# Patient Record
Sex: FEMALE | Race: WHITE | NOT HISPANIC OR LATINO | Employment: UNEMPLOYED | URBAN - NONMETROPOLITAN AREA
[De-identification: names, ages, dates, MRNs, and addresses within clinical notes are randomized per-mention and may not be internally consistent; named-entity substitution may affect disease eponyms.]

---

## 2018-10-04 ENCOUNTER — HOSPITAL ENCOUNTER (EMERGENCY)
Facility: HOSPITAL | Age: 23
Discharge: HOME | End: 2018-10-04
Attending: FAMILY MEDICINE | Admitting: FAMILY MEDICINE

## 2018-10-04 VITALS
OXYGEN SATURATION: 99 % | BODY MASS INDEX: 18.66 KG/M2 | WEIGHT: 126 LBS | DIASTOLIC BLOOD PRESSURE: 60 MMHG | RESPIRATION RATE: 16 BRPM | SYSTOLIC BLOOD PRESSURE: 129 MMHG | TEMPERATURE: 98.9 F | HEIGHT: 69 IN | HEART RATE: 104 BPM
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
## 2018-10-04 DIAGNOSIS — S01.85XA DOG BITE OF FACE, INITIAL ENCOUNTER: Primary | ICD-10-CM

## 2018-10-04 PROCEDURE — 99283 EMERGENCY DEPT VISIT LOW MDM: CPT

## 2018-10-04 RX ORDER — IBUPROFEN 600 MG/1
600 TABLET ORAL EVERY 8 HOURS PRN
Qty: 30 TABLET | Refills: 0 | Status: ON HOLD | OUTPATIENT
Start: 2018-10-04 | End: 2019-12-01

## 2018-10-04 RX ORDER — HYDROCODONE BITARTRATE AND ACETAMINOPHEN 5; 325 MG/1; MG/1
1 TABLET ORAL ONCE
Status: COMPLETED | OUTPATIENT
Start: 2018-10-04 | End: 2018-10-04

## 2018-10-04 RX ORDER — ONDANSETRON 4 MG/1
4 TABLET, ORALLY DISINTEGRATING ORAL ONCE
Status: COMPLETED | OUTPATIENT
Start: 2018-10-04 | End: 2018-10-04

## 2018-10-04 RX ORDER — LIDOCAINE HYDROCHLORIDE 10 MG/ML
5 INJECTION, SOLUTION EPIDURAL; INFILTRATION; INTRACAUDAL; PERINEURAL ONCE
Status: COMPLETED | OUTPATIENT
Start: 2018-10-04 | End: 2018-10-04

## 2018-10-04 RX ORDER — AMOXICILLIN AND CLAVULANATE POTASSIUM 875; 125 MG/1; MG/1
1 TABLET, FILM COATED ORAL ONCE
Status: COMPLETED | OUTPATIENT
Start: 2018-10-04 | End: 2018-10-04

## 2018-10-04 RX ORDER — AMOXICILLIN AND CLAVULANATE POTASSIUM 875; 125 MG/1; MG/1
1 TABLET, FILM COATED ORAL 2 TIMES DAILY
Qty: 20 TABLET | Refills: 0 | Status: ON HOLD | OUTPATIENT
Start: 2018-10-04 | End: 2019-12-01

## 2018-10-04 RX ADMIN — AMOXICILLIN AND CLAVULANATE POTASSIUM 1 TABLET: 875; 125 TABLET, FILM COATED ORAL at 12:00

## 2018-10-04 RX ADMIN — ONDANSETRON 4 MG: 4 TABLET, ORALLY DISINTEGRATING ORAL at 11:59

## 2018-10-04 RX ADMIN — LIDOCAINE HYDROCHLORIDE 5 ML: 10 INJECTION, SOLUTION EPIDURAL; INFILTRATION; INTRACAUDAL at 13:09

## 2018-10-04 RX ADMIN — HYDROCODONE BITARTRATE AND ACETAMINOPHEN 1 TABLET: 5; 325 TABLET ORAL at 12:00

## 2018-10-04 NOTE — ED PROVIDER NOTES
Subjective     History provided by:  Patient  Animal Bite   Contact animal:  Dog  Location:  Face  Facial injury location:  L cheek and R cheek  Time since incident:  4 hours  Pain details:     Quality:  Aching    Severity:  Moderate    Timing:  Constant    Progression:  Unchanged  Incident location:  Another residence  Provoked: provoked (Pt tried to pet a pit bull that was not hers. )    Animal's rabies vaccination status:  Unknown  Animal in possession: no    Relieved by:  Nothing  Ineffective treatments:  None tried  Associated symptoms: no fever        Review of Systems   Constitutional: Negative.  Negative for fever.   HENT: Negative.    Respiratory: Negative.    Cardiovascular: Negative.  Negative for chest pain.   Gastrointestinal: Negative.  Negative for abdominal pain.   Endocrine: Negative.    Genitourinary: Negative.  Negative for dysuria.   Skin: Positive for wound.   Neurological: Negative.    Psychiatric/Behavioral: Negative.    All other systems reviewed and are negative.      History reviewed. No pertinent past medical history.    No Known Allergies    Past Surgical History:   Procedure Laterality Date   • CHOLECYSTECTOMY         History reviewed. No pertinent family history.    Social History     Social History   • Marital status: Single     Social History Main Topics   • Smoking status: Never Smoker   • Smokeless tobacco: Never Used   • Alcohol use No   • Drug use: No   • Sexual activity: Defer     Other Topics Concern   • Not on file           Objective   Physical Exam   Constitutional: She is oriented to person, place, and time. She appears well-developed and well-nourished. No distress.   HENT:   Head: Normocephalic.   Right Ear: External ear normal.   Left Ear: External ear normal.   Nose: Nose normal.   3cm laceration to the right inferior cheek, small puncture wound to the left anterior cheek.    Eyes: Conjunctivae are normal.   Neck: Normal range of motion. Neck supple. No JVD present. No  tracheal deviation present.   Cardiovascular: Normal rate.    No murmur heard.  Pulmonary/Chest: Effort normal. No respiratory distress. She has no wheezes.   Abdominal: Soft. There is no tenderness.   Musculoskeletal: Normal range of motion. She exhibits no edema or deformity.   Neurological: She is alert and oriented to person, place, and time. No cranial nerve deficit.   Skin: Skin is warm and dry. No rash noted. She is not diaphoretic. No erythema. No pallor.   Psychiatric: She has a normal mood and affect. Her behavior is normal. Thought content normal.   Nursing note and vitals reviewed.      Laceration Repair  Date/Time: 10/4/2018 12:56 PM  Performed by: LOPEZ PASCUAL  Authorized by: ANDRES NUÑEZ     Consent:     Consent obtained:  Verbal    Consent given by:  Patient    Risks discussed:  Infection  Anesthesia (see MAR for exact dosages):     Anesthesia method:  Local infiltration    Local anesthetic:  Lidocaine 1% w/o epi  Laceration details:     Location:  Face    Face location:  R cheek    Length (cm):  3  Repair type:     Repair type:  Simple  Pre-procedure details:     Preparation:  Patient was prepped and draped in usual sterile fashion  Exploration:     Hemostasis achieved with:  Direct pressure    Contaminated: no    Treatment:     Area cleansed with:  Betadine    Amount of cleaning:  Standard    Irrigation solution:  Sterile saline    Irrigation method:  Pressure wash    Visualized foreign bodies/material removed: no    Skin repair:     Repair method:  Sutures    Suture size:  4-0    Suture material:  Nylon    Suture technique:  Simple interrupted    Number of sutures:  3  Approximation:     Approximation:  Loose  Post-procedure details:     Dressing:  Non-adherent dressing    Patient tolerance of procedure:  Tolerated well, no immediate complications  Comments:      Laceration was left partially opened to allow for adequate drainage and prevent infection secondary to animal bite.  Explained  to patient Y wound was not completely closed.  Patient understood.               ED Course                  MDM  Number of Diagnoses or Management Options  Dog bite of face, initial encounter: new and does not require workup  Risk of Complications, Morbidity, and/or Mortality  Presenting problems: low  Diagnostic procedures: low  Management options: low    Patient Progress  Patient progress: stable        Final diagnoses:   Dog bite of face, initial encounter            Tremayne Villalobos PA  10/04/18 4544

## 2018-10-04 NOTE — ED TRIAGE NOTES
Pt approached a chained pit bull that did not belong to her. Dog bit her on the right side of her jawline. Rabies status unknown. Owner unknown.

## 2019-08-30 ENCOUNTER — APPOINTMENT (OUTPATIENT)
Dept: LAB | Facility: HOSPITAL | Age: 24
End: 2019-08-30

## 2019-08-30 ENCOUNTER — INITIAL PRENATAL (OUTPATIENT)
Dept: OBSTETRICS AND GYNECOLOGY | Facility: CLINIC | Age: 24
End: 2019-08-30

## 2019-08-30 VITALS — WEIGHT: 151 LBS | BODY MASS INDEX: 22.3 KG/M2 | DIASTOLIC BLOOD PRESSURE: 60 MMHG | SYSTOLIC BLOOD PRESSURE: 103 MMHG

## 2019-08-30 DIAGNOSIS — R82.5 POSITIVE URINE DRUG SCREEN: Primary | ICD-10-CM

## 2019-08-30 DIAGNOSIS — Z34.80 PRENATAL CARE OF MULTIGRAVIDA, ANTEPARTUM: Primary | ICD-10-CM

## 2019-08-30 DIAGNOSIS — Z32.00 PREGNANCY EXAMINATION OR TEST, PREGNANCY UNCONFIRMED: ICD-10-CM

## 2019-08-30 DIAGNOSIS — Z87.59 HISTORY OF MISCARRIAGE: ICD-10-CM

## 2019-08-30 DIAGNOSIS — F12.90 MARIJUANA SMOKER: ICD-10-CM

## 2019-08-30 DIAGNOSIS — F17.210 CIGARETTE SMOKER: ICD-10-CM

## 2019-08-30 LAB
ABO GROUP BLD: NORMAL
AMPHET+METHAMPHET UR QL: POSITIVE
AMPHETAMINES UR QL: POSITIVE
B-HCG UR QL: POSITIVE
BARBITURATES UR QL SCN: NEGATIVE
BASOPHILS # BLD AUTO: 0.05 10*3/MM3 (ref 0–0.2)
BASOPHILS NFR BLD AUTO: 0.4 % (ref 0–1.5)
BENZODIAZ UR QL SCN: NEGATIVE
BILIRUB UR QL STRIP: NEGATIVE
BLD GP AB SCN SERPL QL: NEGATIVE
BUPRENORPHINE SERPL-MCNC: NEGATIVE NG/ML
CANNABINOIDS SERPL QL: POSITIVE
CLARITY UR: ABNORMAL
COCAINE UR QL: NEGATIVE
COLOR UR: YELLOW
DEPRECATED RDW RBC AUTO: 43.3 FL (ref 37–54)
EOSINOPHIL # BLD AUTO: 0.19 10*3/MM3 (ref 0–0.4)
EOSINOPHIL NFR BLD AUTO: 1.4 % (ref 0.3–6.2)
ERYTHROCYTE [DISTWIDTH] IN BLOOD BY AUTOMATED COUNT: 12.9 % (ref 12.3–15.4)
GLUCOSE UR STRIP-MCNC: NEGATIVE MG/DL
HBV SURFACE AG SERPL QL IA: NORMAL
HCT VFR BLD AUTO: 38.8 % (ref 34–46.6)
HCV AB SER DONR QL: NORMAL
HGB BLD-MCNC: 12.4 G/DL (ref 12–15.9)
HGB UR QL STRIP.AUTO: NEGATIVE
HIV1+2 AB SER QL: NORMAL
IMM GRANULOCYTES # BLD AUTO: 0.1 10*3/MM3 (ref 0–0.05)
IMM GRANULOCYTES NFR BLD AUTO: 0.7 % (ref 0–0.5)
INTERNAL NEGATIVE CONTROL: NEGATIVE
INTERNAL POSITIVE CONTROL: POSITIVE
KETONES UR QL STRIP: NEGATIVE
LEUKOCYTE ESTERASE UR QL STRIP.AUTO: ABNORMAL
LYMPHOCYTES # BLD AUTO: 3.13 10*3/MM3 (ref 0.7–3.1)
LYMPHOCYTES NFR BLD AUTO: 23.2 % (ref 19.6–45.3)
Lab: ABNORMAL
Lab: NORMAL
MCH RBC QN AUTO: 29.2 PG (ref 26.6–33)
MCHC RBC AUTO-ENTMCNC: 32 G/DL (ref 31.5–35.7)
MCV RBC AUTO: 91.5 FL (ref 79–97)
METHADONE UR QL SCN: NEGATIVE
MONOCYTES # BLD AUTO: 0.7 10*3/MM3 (ref 0.1–0.9)
MONOCYTES NFR BLD AUTO: 5.2 % (ref 5–12)
NEUTROPHILS # BLD AUTO: 9.31 10*3/MM3 (ref 1.7–7)
NEUTROPHILS NFR BLD AUTO: 69.1 % (ref 42.7–76)
NITRITE UR QL STRIP: NEGATIVE
NRBC BLD AUTO-RTO: 0 /100 WBC (ref 0–0.2)
OPIATES UR QL: NEGATIVE
OXYCODONE UR QL SCN: NEGATIVE
PCP UR QL SCN: NEGATIVE
PH UR STRIP.AUTO: 6 [PH] (ref 5–8)
PLATELET # BLD AUTO: 250 10*3/MM3 (ref 140–450)
PMV BLD AUTO: 10.3 FL (ref 6–12)
PROPOXYPH UR QL: NEGATIVE
PROT UR QL STRIP: NEGATIVE
RBC # BLD AUTO: 4.24 10*6/MM3 (ref 3.77–5.28)
RH BLD: POSITIVE
RPR SER QL: NORMAL
SP GR UR STRIP: 1.02 (ref 1–1.03)
TRICYCLICS UR QL SCN: NEGATIVE
UROBILINOGEN UR QL STRIP: ABNORMAL
WBC NRBC COR # BLD: 13.48 10*3/MM3 (ref 3.4–10.8)

## 2019-08-30 PROCEDURE — 80306 DRUG TEST PRSMV INSTRMNT: CPT | Performed by: NURSE PRACTITIONER

## 2019-08-30 PROCEDURE — 86803 HEPATITIS C AB TEST: CPT | Performed by: NURSE PRACTITIONER

## 2019-08-30 PROCEDURE — 87591 N.GONORRHOEAE DNA AMP PROB: CPT | Performed by: NURSE PRACTITIONER

## 2019-08-30 PROCEDURE — 85025 COMPLETE CBC W/AUTO DIFF WBC: CPT | Performed by: NURSE PRACTITIONER

## 2019-08-30 PROCEDURE — 87491 CHLMYD TRACH DNA AMP PROBE: CPT | Performed by: NURSE PRACTITIONER

## 2019-08-30 PROCEDURE — G0432 EIA HIV-1/HIV-2 SCREEN: HCPCS | Performed by: NURSE PRACTITIONER

## 2019-08-30 PROCEDURE — 86592 SYPHILIS TEST NON-TREP QUAL: CPT | Performed by: NURSE PRACTITIONER

## 2019-08-30 PROCEDURE — 81003 URINALYSIS AUTO W/O SCOPE: CPT | Performed by: NURSE PRACTITIONER

## 2019-08-30 PROCEDURE — 86900 BLOOD TYPING SEROLOGIC ABO: CPT | Performed by: NURSE PRACTITIONER

## 2019-08-30 PROCEDURE — 87340 HEPATITIS B SURFACE AG IA: CPT | Performed by: NURSE PRACTITIONER

## 2019-08-30 PROCEDURE — 87661 TRICHOMONAS VAGINALIS AMPLIF: CPT | Performed by: NURSE PRACTITIONER

## 2019-08-30 PROCEDURE — 87086 URINE CULTURE/COLONY COUNT: CPT | Performed by: NURSE PRACTITIONER

## 2019-08-30 PROCEDURE — G0480 DRUG TEST DEF 1-7 CLASSES: HCPCS | Performed by: NURSE PRACTITIONER

## 2019-08-30 PROCEDURE — 80081 OBSTETRIC PANEL INC HIV TSTG: CPT | Performed by: NURSE PRACTITIONER

## 2019-08-30 PROCEDURE — 86901 BLOOD TYPING SEROLOGIC RH(D): CPT | Performed by: NURSE PRACTITIONER

## 2019-08-30 PROCEDURE — 86850 RBC ANTIBODY SCREEN: CPT | Performed by: NURSE PRACTITIONER

## 2019-08-30 PROCEDURE — 81025 URINE PREGNANCY TEST: CPT | Performed by: NURSE PRACTITIONER

## 2019-08-30 PROCEDURE — 36415 COLL VENOUS BLD VENIPUNCTURE: CPT

## 2019-08-30 RX ORDER — PRENATAL VIT/IRON FUM/FOLIC AC 27 MG-1 MG
1 TABLET ORAL DAILY
Qty: 30 EACH | Refills: 11 | Status: SHIPPED | OUTPATIENT
Start: 2019-08-30 | End: 2019-09-29

## 2019-08-30 NOTE — PROGRESS NOTES
I spent approximately 60 minutes with the patient acquiring the health and history intake and discussing topics related to healthy lifestyle. This is her 2nd pregnancy. She had a miscarriage with her 1st pregnancy. She states she has depression, bipolar disorder, and schizophrenia. She has stopped all medicines. She is a patient at the [facility]. I encouraged her to make an appointment and tell them she has stopped medicine and now pregnant. A newob bag is given. The 1st trimester teaching was done with the patient. We discussed a healthy diet and exercise and what is recommended. I also discussed Listeriosis and Toxoplasmosis and what fish to avoid due to high mercury levels. Informed patient not to be in hot tubs, saunas, or tanning beds. We discussed that spotting may occur after intercourse which is common, but if heavy bleeding like a period occurs to call [facility] or hospital if clinic is closed.  I encouraged her to make an appointment with the dentist if she has not had a dental exam and cleaning in the last 6 months. I instructed the patient that alcohol, illicit drug use, and tobacco smoking should be avoided in pregnancy. The patient does smoke and trying to quit. She also smokes marijuana and plans to quit. I told her she needs to stop smoking both to have a healthy pregnancy. We also discussed the importance of avoiding second hand smoke. We discussed the hospital policy procedure if a patient has a positive urine drug screen at the time of admission to the hospital. She plans to breastfeed. I gave her pamphlet on breastfeeding classes and the breastfeeding mothers support group that is provided by Marianela Mosqueda, Lactation Consultant. We discussed the resources that is offered at the health departments, and we discussed that some health departments have a breastfeeding peer counselor. She has signed up for WIC and is on waiting list for HANDS. I informed patient that group  prenatal education classes are offered here. I instructed patient to let the provider know if she would like to join a group after EDC is established.  I discussed with the patient that a pediatrician needs to be chosen prior to delivery for the infant to have an appointment  scheduled before leaving the hospital.   I discussed lab tests will be done today. She signed a release to get her most recent pap smear result from Dr. Obrien's office.  I encouraged the patient to get the TDAP vaccine in the 3rd trimester. I told the patient that health departments are giving the TDAP vaccine to family members. All questions were answered at this time.

## 2019-08-31 LAB
BACTERIA SPEC AEROBE CULT: NORMAL
C TRACH RRNA CVX QL NAA+PROBE: NEGATIVE
N GONORRHOEA RRNA SPEC QL NAA+PROBE: NEGATIVE
RUBV IGG SERPL IA-ACNC: POSITIVE
TRICHOMONAS VAGINALIS PCR: NEGATIVE

## 2019-09-06 LAB
AMPHET UR CFM-MCNC: 1078 NG/ML
AMPHET UR QL CFM: POSITIVE
AMPHETAMINES UR QL: POSITIVE
Lab: ABNORMAL
METHAMPHET UR CFM-MCNC: >4000 NG/ML
METHAMPHET UR QL CFM: POSITIVE

## 2019-09-12 ENCOUNTER — HOSPITAL ENCOUNTER (EMERGENCY)
Facility: HOSPITAL | Age: 24
Discharge: HOME | End: 2019-09-12
Attending: EMERGENCY MEDICINE | Admitting: EMERGENCY MEDICINE

## 2019-09-12 VITALS
HEART RATE: 82 BPM | WEIGHT: 144.25 LBS | RESPIRATION RATE: 18 BRPM | HEIGHT: 69 IN | OXYGEN SATURATION: 100 % | SYSTOLIC BLOOD PRESSURE: 103 MMHG | TEMPERATURE: 97.4 F | DIASTOLIC BLOOD PRESSURE: 68 MMHG | BODY MASS INDEX: 21.36 KG/M2

## 2019-09-12 DIAGNOSIS — O21.9 NAUSEA AND VOMITING IN PREGNANCY PRIOR TO 22 WEEKS GESTATION: Primary | ICD-10-CM

## 2019-09-12 LAB
ALBUMIN SERPL-MCNC: 4 G/DL (ref 3.5–5.2)
ALBUMIN/GLOB SERPL: 1.3 G/DL
ALP SERPL-CCNC: 68 U/L (ref 39–117)
ALT SERPL W P-5'-P-CCNC: 12 U/L (ref 1–33)
ANION GAP SERPL CALCULATED.3IONS-SCNC: 11 MMOL/L (ref 5–15)
AST SERPL-CCNC: 13 U/L (ref 1–32)
BACTERIA UR QL AUTO: ABNORMAL /HPF
BASOPHILS # BLD AUTO: 0.05 10*3/MM3 (ref 0–0.2)
BASOPHILS NFR BLD AUTO: 0.4 % (ref 0–1.5)
BILIRUB SERPL-MCNC: 0.3 MG/DL (ref 0.2–1.2)
BILIRUB UR QL STRIP: NEGATIVE
BUN BLD-MCNC: 9 MG/DL (ref 6–20)
BUN/CREAT SERPL: 17.3 (ref 7–25)
CALCIUM SPEC-SCNC: 9.4 MG/DL (ref 8.6–10.5)
CHLORIDE SERPL-SCNC: 104 MMOL/L (ref 98–107)
CLARITY UR: ABNORMAL
CO2 SERPL-SCNC: 24 MMOL/L (ref 22–29)
COLOR UR: YELLOW
CREAT BLD-MCNC: 0.52 MG/DL (ref 0.57–1)
DEPRECATED RDW RBC AUTO: 42.4 FL (ref 37–54)
EOSINOPHIL # BLD AUTO: 0.17 10*3/MM3 (ref 0–0.4)
EOSINOPHIL NFR BLD AUTO: 1.4 % (ref 0.3–6.2)
ERYTHROCYTE [DISTWIDTH] IN BLOOD BY AUTOMATED COUNT: 13.3 % (ref 12.3–15.4)
GFR SERPL CREATININE-BSD FRML MDRD: 145 ML/MIN/1.73
GLOBULIN UR ELPH-MCNC: 3.1 GM/DL
GLUCOSE BLD-MCNC: 82 MG/DL (ref 65–99)
GLUCOSE UR STRIP-MCNC: NEGATIVE MG/DL
HCG INTACT+B SERPL-ACNC: 5453 MIU/ML
HCT VFR BLD AUTO: 38.2 % (ref 34–46.6)
HGB BLD-MCNC: 12.6 G/DL (ref 12–15.9)
HGB UR QL STRIP.AUTO: NEGATIVE
HOLD SPECIMEN: NORMAL
HYALINE CASTS UR QL AUTO: ABNORMAL /LPF
IMM GRANULOCYTES # BLD AUTO: 0.07 10*3/MM3 (ref 0–0.05)
IMM GRANULOCYTES NFR BLD AUTO: 0.6 % (ref 0–0.5)
KETONES UR QL STRIP: ABNORMAL
LEUKOCYTE ESTERASE UR QL STRIP.AUTO: ABNORMAL
LYMPHOCYTES # BLD AUTO: 3.14 10*3/MM3 (ref 0.7–3.1)
LYMPHOCYTES NFR BLD AUTO: 25.3 % (ref 19.6–45.3)
MCH RBC QN AUTO: 28.8 PG (ref 26.6–33)
MCHC RBC AUTO-ENTMCNC: 33 G/DL (ref 31.5–35.7)
MCV RBC AUTO: 87.4 FL (ref 79–97)
MONOCYTES # BLD AUTO: 0.77 10*3/MM3 (ref 0.1–0.9)
MONOCYTES NFR BLD AUTO: 6.2 % (ref 5–12)
NEUTROPHILS # BLD AUTO: 8.2 10*3/MM3 (ref 1.7–7)
NEUTROPHILS NFR BLD AUTO: 66.1 % (ref 42.7–76)
NITRITE UR QL STRIP: NEGATIVE
NRBC BLD AUTO-RTO: 0 /100 WBC (ref 0–0.2)
PH UR STRIP.AUTO: 6.5 [PH] (ref 5–9)
PLATELET # BLD AUTO: 245 10*3/MM3 (ref 140–450)
PMV BLD AUTO: 9.2 FL (ref 6–12)
POTASSIUM BLD-SCNC: 3.7 MMOL/L (ref 3.5–5.2)
PROT SERPL-MCNC: 7.1 G/DL (ref 6–8.5)
PROT UR QL STRIP: NEGATIVE
RBC # BLD AUTO: 4.37 10*6/MM3 (ref 3.77–5.28)
RBC # UR: ABNORMAL /HPF
REF LAB TEST METHOD: ABNORMAL
SODIUM BLD-SCNC: 139 MMOL/L (ref 136–145)
SP GR UR STRIP: 1.03 (ref 1–1.03)
SQUAMOUS #/AREA URNS HPF: ABNORMAL /HPF
UROBILINOGEN UR QL STRIP: ABNORMAL
WBC NRBC COR # BLD: 12.4 10*3/MM3 (ref 3.4–10.8)
WBC UR QL AUTO: ABNORMAL /HPF
WHOLE BLOOD HOLD SPECIMEN: NORMAL
WHOLE BLOOD HOLD SPECIMEN: NORMAL

## 2019-09-12 PROCEDURE — 81001 URINALYSIS AUTO W/SCOPE: CPT | Performed by: PHYSICIAN ASSISTANT

## 2019-09-12 PROCEDURE — 80053 COMPREHEN METABOLIC PANEL: CPT | Performed by: PHYSICIAN ASSISTANT

## 2019-09-12 PROCEDURE — 25010000002 ONDANSETRON PER 1 MG: Performed by: PHYSICIAN ASSISTANT

## 2019-09-12 PROCEDURE — 96374 THER/PROPH/DIAG INJ IV PUSH: CPT

## 2019-09-12 PROCEDURE — 99284 EMERGENCY DEPT VISIT MOD MDM: CPT

## 2019-09-12 PROCEDURE — 85025 COMPLETE CBC W/AUTO DIFF WBC: CPT | Performed by: PHYSICIAN ASSISTANT

## 2019-09-12 PROCEDURE — 84702 CHORIONIC GONADOTROPIN TEST: CPT | Performed by: PHYSICIAN ASSISTANT

## 2019-09-12 RX ORDER — ONDANSETRON 4 MG/1
4 TABLET, ORALLY DISINTEGRATING ORAL EVERY 6 HOURS PRN
Qty: 30 TABLET | Refills: 0 | Status: SHIPPED | OUTPATIENT
Start: 2019-09-12 | End: 2019-09-19

## 2019-09-12 RX ORDER — SODIUM CHLORIDE 0.9 % (FLUSH) 0.9 %
10 SYRINGE (ML) INJECTION AS NEEDED
Status: DISCONTINUED | OUTPATIENT
Start: 2019-09-12 | End: 2019-09-12 | Stop reason: HOSPADM

## 2019-09-12 RX ORDER — ONDANSETRON 2 MG/ML
4 INJECTION INTRAMUSCULAR; INTRAVENOUS ONCE
Status: COMPLETED | OUTPATIENT
Start: 2019-09-12 | End: 2019-09-12

## 2019-09-12 RX ADMIN — ONDANSETRON 4 MG: 2 INJECTION INTRAMUSCULAR; INTRAVENOUS at 15:55

## 2019-09-12 RX ADMIN — SODIUM CHLORIDE 1000 ML: 900 INJECTION, SOLUTION INTRAVENOUS at 15:39

## 2019-09-12 NOTE — ED NOTES
Pt states that she is unable to provide urine specimen at this time.      Rosaura Osborn, RN  09/12/19 [id number]

## 2019-09-12 NOTE — ED PROVIDER NOTES
Subjective   Patient presents to emergency department for nausea and vomiting in pregnancy x 6 days.  She is 19 weeks pregnant and has not taken anything for her nausea vomiting.  Denies vaginal bleeding, discharge, pelvic pain.  History of anxiety, bipolar, depression, schizophrenia.        History provided by:  Patient   used: No    Nausea   The primary symptoms include fatigue, nausea and vomiting. Primary symptoms do not include fever, abdominal pain, dysuria or myalgias.   The illness does not include chills.   Vomiting   The primary symptoms include fatigue, nausea and vomiting. Primary symptoms do not include fever, abdominal pain, dysuria or myalgias.   The illness does not include chills.       Review of Systems   Constitutional: Positive for fatigue. Negative for chills and fever.   HENT: Negative for sore throat and trouble swallowing.    Respiratory: Negative for shortness of breath and wheezing.    Cardiovascular: Negative for chest pain.   Gastrointestinal: Positive for nausea and vomiting. Negative for abdominal pain.   Genitourinary: Negative for dysuria, flank pain, pelvic pain, vaginal bleeding, vaginal discharge and vaginal pain.   Musculoskeletal: Negative for myalgias.   Skin: Negative for color change.   Neurological: Negative for syncope and weakness.   Hematological: Does not bruise/bleed easily.   Psychiatric/Behavioral: Negative for confusion.       Past Medical History:   Diagnosis Date   • Abnormal Pap smear of cervix    • Anxiety    • Asthma    • Bipolar disorder (CMS/HCC)    • Depression    • Endometriosis    • History of chicken pox    • Ovarian cyst    • Schizophrenia (CMS/HCC)        Allergies   Allergen Reactions   • Penicillins Rash and Other (See Comments)     fever       Past Surgical History:   Procedure Laterality Date   • CHOLECYSTECTOMY     • LAPAROSCOPIC CHOLECYSTECTOMY     • WISDOM TOOTH EXTRACTION         Family History   Problem Relation Age of Onset  "  • Nephritis Father    • Hypertension Mother    • Mental retardation Brother    • No Known Problems Sister    • Lupus Paternal Grandfather    • Breast cancer Paternal Grandmother    • Dementia Maternal Grandmother    • No Known Problems Maternal Grandfather    • No Known Problems Brother    • Breast cancer Paternal Aunt        Social History     Socioeconomic History   • Marital status: Single     Spouse name: Not on file   • Number of children: Not on file   • Years of education: Not on file   • Highest education level: Not on file   Tobacco Use   • Smoking status: Current Every Day Smoker     Packs/day: 0.00   • Smokeless tobacco: Never Used   • Tobacco comment: 2-3 cigarettes per day   Substance and Sexual Activity   • Alcohol use: No   • Drug use: Yes     Types: Marijuana   • Sexual activity: Yes     Partners: Male     Comment: last pap smear 2018- negative at dr. lane's office            Objective      /68 (Patient Position: Sitting)   Pulse 82   Temp 97.4 °F (36.3 °C) (Oral)   Resp 18   Ht 175.3 cm (69\")   Wt 65.4 kg (144 lb 4 oz)   LMP 05/01/2019   SpO2 100%   BMI 21.30 kg/m²     Physical Exam   Constitutional: She is oriented to person, place, and time. She appears well-developed and well-nourished.   HENT:   Head: Normocephalic and atraumatic.   Eyes: Conjunctivae are normal.   Cardiovascular: Normal rate, regular rhythm, normal heart sounds and intact distal pulses.   Pulmonary/Chest: Effort normal and breath sounds normal. No respiratory distress. She has no wheezes.   Abdominal: Soft. Bowel sounds are normal. She exhibits no distension and no mass. There is no tenderness. There is no guarding.   Musculoskeletal: She exhibits no edema.   Neurological: She is alert and oriented to person, place, and time.   Skin: Skin is warm and dry. Capillary refill takes less than 2 seconds.   Psychiatric: She has a normal mood and affect. Her behavior is normal. Thought content normal.   Nursing note " and vitals reviewed.      Procedures           ED Course      Results for orders placed or performed during the hospital encounter of 09/12/19   Comprehensive Metabolic Panel   Result Value Ref Range    Glucose 82 65 - 99 mg/dL    BUN 9 6 - 20 mg/dL    Creatinine 0.52 (L) 0.57 - 1.00 mg/dL    Sodium 139 136 - 145 mmol/L    Potassium 3.7 3.5 - 5.2 mmol/L    Chloride 104 98 - 107 mmol/L    CO2 24.0 22.0 - 29.0 mmol/L    Calcium 9.4 8.6 - 10.5 mg/dL    Total Protein 7.1 6.0 - 8.5 g/dL    Albumin 4.00 3.50 - 5.20 g/dL    ALT (SGPT) 12 1 - 33 U/L    AST (SGOT) 13 1 - 32 U/L    Alkaline Phosphatase 68 39 - 117 U/L    Total Bilirubin 0.3 0.2 - 1.2 mg/dL    eGFR Non African Amer 145 >60 mL/min/1.73    Globulin 3.1 gm/dL    A/G Ratio 1.3 g/dL    BUN/Creatinine Ratio 17.3 7.0 - 25.0    Anion Gap 11.0 5.0 - 15.0 mmol/L   Urinalysis With Microscopic If Indicated (No Culture) - Urine, Clean Catch   Result Value Ref Range    Color, UA Yellow Yellow, Straw, Dark Yellow, Annette    Appearance, UA Cloudy (A) Clear    pH, UA 6.5 5.0 - 9.0    Specific Gravity, UA 1.027 1.003 - 1.030    Glucose, UA Negative Negative    Ketones, UA Trace (A) Negative    Bilirubin, UA Negative Negative    Blood, UA Negative Negative    Protein, UA Negative Negative    Leuk Esterase, UA Moderate (2+) (A) Negative    Nitrite, UA Negative Negative    Urobilinogen, UA 0.2 E.U./dL 0.2 - 1.0 E.U./dL   CBC Auto Differential   Result Value Ref Range    WBC 12.40 (H) 3.40 - 10.80 10*3/mm3    RBC 4.37 3.77 - 5.28 10*6/mm3    Hemoglobin 12.6 12.0 - 15.9 g/dL    Hematocrit 38.2 34.0 - 46.6 %    MCV 87.4 79.0 - 97.0 fL    MCH 28.8 26.6 - 33.0 pg    MCHC 33.0 31.5 - 35.7 g/dL    RDW 13.3 12.3 - 15.4 %    RDW-SD 42.4 37.0 - 54.0 fl    MPV 9.2 6.0 - 12.0 fL    Platelets 245 140 - 450 10*3/mm3    Neutrophil % 66.1 42.7 - 76.0 %    Lymphocyte % 25.3 19.6 - 45.3 %    Monocyte % 6.2 5.0 - 12.0 %    Eosinophil % 1.4 0.3 - 6.2 %    Basophil % 0.4 0.0 - 1.5 %    Immature Grans %  0.6 (H) 0.0 - 0.5 %    Neutrophils, Absolute 8.20 (H) 1.70 - 7.00 10*3/mm3    Lymphocytes, Absolute 3.14 (H) 0.70 - 3.10 10*3/mm3    Monocytes, Absolute 0.77 0.10 - 0.90 10*3/mm3    Eosinophils, Absolute 0.17 0.00 - 0.40 10*3/mm3    Basophils, Absolute 0.05 0.00 - 0.20 10*3/mm3    Immature Grans, Absolute 0.07 (H) 0.00 - 0.05 10*3/mm3    nRBC 0.0 0.0 - 0.2 /100 WBC   hCG, Quantitative, Pregnancy   Result Value Ref Range    HCG Quantitative 5,453.00 mIU/mL   Urinalysis, Microscopic Only - Urine, Clean Catch   Result Value Ref Range    RBC, UA 0-2 (A) None Seen /HPF    WBC, UA 13-20 (A) None Seen, 0-2, 3-5 /HPF    Bacteria, UA 2+ (A) None Seen /HPF    Squamous Epithelial Cells, UA 6-12 (A) None Seen, 0-2 /HPF    Hyaline Casts, UA 7-12 None Seen /LPF    Methodology Automated Microscopy    Light Blue Top   Result Value Ref Range    Extra Tube hold for add-on    Green Top (Gel)   Result Value Ref Range    Extra Tube Hold for add-ons.    Lavender Top   Result Value Ref Range    Extra Tube hold for add-on      Discussed results with patient.  Gave educational materials.  Advised close follow up with OB/GYN.  Return to emergency department for new or worsening symptoms.                MDM    Final diagnoses:   Nausea and vomiting in pregnancy prior to 22 weeks gestation              Andrea Reynoso PA-C  09/13/19 [id number]

## 2019-10-19 ENCOUNTER — HOSPITAL ENCOUNTER (OUTPATIENT)
Facility: HOSPITAL | Age: 24
Discharge: HOME | End: 2019-10-20
Attending: OBSTETRICS & GYNECOLOGY | Admitting: OBSTETRICS & GYNECOLOGY

## 2019-10-19 VITALS
TEMPERATURE: 98.5 F | SYSTOLIC BLOOD PRESSURE: 128 MMHG | OXYGEN SATURATION: 97 % | DIASTOLIC BLOOD PRESSURE: 63 MMHG | RESPIRATION RATE: 20 BRPM | HEART RATE: 105 BPM

## 2019-10-19 PROCEDURE — 87510 GARDNER VAG DNA DIR PROBE: CPT | Performed by: OBSTETRICS & GYNECOLOGY

## 2019-10-19 PROCEDURE — 87660 TRICHOMONAS VAGIN DIR PROBE: CPT | Performed by: OBSTETRICS & GYNECOLOGY

## 2019-10-19 PROCEDURE — 59025 FETAL NON-STRESS TEST: CPT

## 2019-10-19 PROCEDURE — 87086 URINE CULTURE/COLONY COUNT: CPT | Performed by: OBSTETRICS & GYNECOLOGY

## 2019-10-19 PROCEDURE — 59025 FETAL NON-STRESS TEST: CPT | Performed by: OBSTETRICS & GYNECOLOGY

## 2019-10-19 PROCEDURE — 80307 DRUG TEST PRSMV CHEM ANLYZR: CPT | Performed by: OBSTETRICS & GYNECOLOGY

## 2019-10-19 PROCEDURE — 81001 URINALYSIS AUTO W/SCOPE: CPT | Performed by: OBSTETRICS & GYNECOLOGY

## 2019-10-19 PROCEDURE — 87480 CANDIDA DNA DIR PROBE: CPT | Performed by: OBSTETRICS & GYNECOLOGY

## 2019-10-19 PROCEDURE — G0463 HOSPITAL OUTPT CLINIC VISIT: HCPCS

## 2019-10-19 RX ORDER — ALBUTEROL SULFATE 90 UG/1
2 AEROSOL, METERED RESPIRATORY (INHALATION) ONCE AS NEEDED
COMMUNITY

## 2019-10-19 RX ORDER — PRENATAL VIT NO.126/IRON/FOLIC 28MG-0.8MG
1 TABLET ORAL DAILY
COMMUNITY
End: 2021-07-24

## 2019-10-20 ENCOUNTER — APPOINTMENT (OUTPATIENT)
Dept: ULTRASOUND IMAGING | Facility: HOSPITAL | Age: 24
End: 2019-10-20

## 2019-10-20 LAB
AMPHET+METHAMPHET UR QL: NEGATIVE
AMPHETAMINES UR QL: NEGATIVE
BACTERIA UR QL AUTO: ABNORMAL /HPF
BARBITURATES UR QL SCN: NEGATIVE
BENZODIAZ UR QL SCN: NEGATIVE
BILIRUB UR QL STRIP: NEGATIVE
BUPRENORPHINE SERPL-MCNC: NEGATIVE NG/ML
CANDIDA ALBICANS: NEGATIVE
CANNABINOIDS SERPL QL: NEGATIVE
CLARITY UR: ABNORMAL
COCAINE UR QL: NEGATIVE
COLOR UR: YELLOW
GARDNERELLA VAGINALIS: POSITIVE
GLUCOSE UR STRIP-MCNC: NEGATIVE MG/DL
HGB UR QL STRIP.AUTO: NEGATIVE
HYALINE CASTS UR QL AUTO: ABNORMAL /LPF
KETONES UR QL STRIP: NEGATIVE
LEUKOCYTE ESTERASE UR QL STRIP.AUTO: ABNORMAL
METHADONE UR QL SCN: NEGATIVE
NITRITE UR QL STRIP: NEGATIVE
OPIATES UR QL: NEGATIVE
OXYCODONE UR QL SCN: NEGATIVE
PCP UR QL SCN: NEGATIVE
PH UR STRIP.AUTO: 6.5 [PH] (ref 5–9)
PROPOXYPH UR QL: NEGATIVE
PROT UR QL STRIP: NEGATIVE
RBC # UR: ABNORMAL /HPF
REF LAB TEST METHOD: ABNORMAL
SP GR UR STRIP: 1.02 (ref 1–1.03)
SQUAMOUS #/AREA URNS HPF: ABNORMAL /HPF
T VAGINALIS DNA VAG QL PROBE+SIG AMP: NEGATIVE
TRICYCLICS UR QL SCN: NEGATIVE
UROBILINOGEN UR QL STRIP: ABNORMAL
WBC UR QL AUTO: ABNORMAL /HPF

## 2019-10-20 PROCEDURE — 76817 TRANSVAGINAL US OBSTETRIC: CPT

## 2019-10-20 PROCEDURE — 76815 OB US LIMITED FETUS(S): CPT

## 2019-10-20 PROCEDURE — 99214 OFFICE O/P EST MOD 30 MIN: CPT | Performed by: OBSTETRICS & GYNECOLOGY

## 2019-10-20 RX ORDER — METRONIDAZOLE 7.5 MG/G
1 GEL VAGINAL NIGHTLY
Status: DISCONTINUED | OUTPATIENT
Start: 2019-10-20 | End: 2019-10-20 | Stop reason: HOSPADM

## 2019-10-20 RX ADMIN — METRONIDAZOLE 1 APPLICATION: 7.5 GEL VAGINAL at 02:19

## 2019-10-20 NOTE — PROGRESS NOTES
[facility]  Obstetric History and Physical    Chief Complaint   Patient presents with   • Abdominal Cramping     cramping started about an hour ago           Patient is a 24 y.o. female  currently at 24w4d, who presents with patient presented with crampy lower abdominal pain over the uterus at worst 6 of 10.  This occurred starting 10/19/2019 in the late evening.  It occurred about at the most frequently every 6 minutes denies associated rupture membranes better or vaginal bleeding it ended about 4:00 in the morning 10/20/2019.    Her prenatal care is been very fragmented/.  She has been seen at [facility] by Dr. Collado and in [place] and has an appointment at [facility]     Obstetric History   #: 1, Date: , Sex: None, Weight: None, GA: None, Delivery: None, Apgar1: None, Apgar5: None, Living: None, Birth Comments: None    #: 2, Date: None, Sex: None, Weight: None, GA: None, Delivery: None, Apgar1: None, Apgar5: None, Living: None, Birth Comments: None       The following portions of the patients history were reviewed and updated as appropriate: current medications, allergies, past medical history, past surgical history, past family history, past social history and problem list .       Prenatal Information:  Prenatal Results     Initial Prenatal Labs     Test Value Reference Range Date Time    Hemoglobin 12.6 g/dL 12.0 - 15.9 g/dL 19 1539    Hematocrit 38.2 % 34.0 - 46.6 % 19 1539    Platelets 245 10*3/mm3 140 - 450 10*3/mm3 19 1539    Rubella IgG Positive   19 1048    Hepatitis B SAg Non-Reactive  Non-Reactive 19 1048    Hepatitis C Ab Non-Reactive  Non-Reactive 19 1048    RPR Non-Reactive  Non-Reactive 19 1048    ABO A   19 1048    Rh Positive   19 1048    Antibody Screen Negative   19 1048    HIV Non-Reactive  Non-Reactive 19 1048    Urine Culture >100,000 CFU/mL Mixed [place] Isolated   19 1048    Gonorrhea  Negative  Negative 08/30/19 1048    Chlamydia Negative  Negative 08/30/19 1048    TSH              2nd and 3rd Trimester     Test Value Reference Range Date Time    Hemoglobin (repeated)        Hematocrit (repeated)        GCT        Antibody Screen (repeated)        GTT Fasting        GTT 1 Hr        GTT 2 Hr        GTT 3 Hr        Group B Strep              Drug Screening     Test Value Reference Range Date Time    Amphetamine Screen Negative  Negative 10/19/19 2343    Barbiturate Screen Negative  Negative 10/19/19 2343    Benzodiazepine Screen Negative  Negative 10/19/19 2343    Methadone Screen Negative  Negative 10/19/19 2343    Phencyclidine Screen Negative  Negative 10/19/19 2343    Opiates Screen Negative  Negative 10/19/19 2343    THC Screen Negative  Negative 10/19/19 2343    Cocaine Screen Negative  Negative 10/19/19 2343    Propoxyphene Screen Negative  Negative 10/19/19 2343    Buprenorphine Screen Negative  Negative 10/19/19 2343    Methamphetamine Screen Negative  Negative 10/19/19 2343    Oxycodone Screen Negative  Negative 10/19/19 2343    Tricyclic Antidepressants Screen Negative  Negative 10/19/19 2343          Other (Risk screening)     Test Value Reference Range Date Time    Varicella IgG        Parvovirus IgG        CMV IgG        Cystic Fibrosis        Hemoglobin electrophoresis        NIPT        MSAFP-4        AFP (for NTD only)                  External Prenatal Results     Pregnancy Outside Results - Transcribed From Office Records - See Scanned Records For Details     Test Value Date Time    Hgb 12.6 g/dL 09/12/19 1539    Hct 38.2 % 09/12/19 1539    ABO A  08/30/19 1048    Rh Positive  08/30/19 1048    Antibody Screen Negative  08/30/19 1048    Glucose Fasting GTT       Glucose Tolerance Test 1 hour       Glucose Tolerance Test 3 hour       Gonorrhea (discrete) Negative  08/30/19 1048    Chlamydia (discrete) Negative  08/30/19 1048    RPR Non-Reactive  08/30/19 1048    VDRL       Syphilis  Antibody       Rubella Positive  19 1048    HBsAg Non-Reactive  19 1048    Herpes Simplex Virus PCR       Herpes Simplex VIrus Culture       HIV Non-Reactive  19 1048    Hep C RNA Quant PCR       Hep C Antibody Non-Reactive  19 1048    AFP       Group B Strep       GBS Susceptibility to Clindamycin       GBS Susceptibility to Erythromycin       Fetal Fibronectin       Genetic Testing, Maternal Blood             Drug Screening     Test Value Date Time    Urine Drug Screen       Amphetamine Screen Negative  10/19/19 2343    Barbiturate Screen Negative  10/19/19 2343    Benzodiazepine Screen Negative  10/19/19 2343    Methadone Screen Negative  10/19/19 2343    Phencyclidine Screen Negative  10/19/19 2343    Opiates Screen Negative  10/19/19 2343    THC Screen Negative  10/19/19 2343    Cocaine Screen       Propoxyphene Screen Negative  10/19/19 2343    Buprenorphine Screen Negative  10/19/19 2343    Methamphetamine Screen       Oxycodone Screen Negative  10/19/19 2343    Tricyclic Antidepressants Screen Negative  10/19/19 2343                 Past OB History:     Obstetric History       T0      L0     SAB1   TAB0   Ectopic0   Molar0   Multiple0   Live Births0       # Outcome Date GA Lbr Rm/2nd Weight Sex Delivery Anes PTL Lv   2 Current            1 2013                   ALLERGIES:     Allergies   Allergen Reactions   • Penicillins Rash and Other (See Comments)     fever        Home Medications:     Prior to Admission medications    Medication Sig Start Date End Date Taking? Authorizing Provider   albuterol sulfate  (90 Base) MCG/ACT inhaler Inhale 2 puffs 1 (One) Time As Needed for Wheezing or Shortness of Air.   Yes Harlan Roberts MD   Loratadine-Pseudoephedrine (CLARITIN-D 24 HOUR PO) Take 1 tablet by mouth Daily.   Yes Harlan Roberts MD   Prenatal Vit-Fe Fumarate-FA (PRENATAL, CLASSIC, VITAMIN) 28-0.8 MG tablet tablet Take 1 tablet by mouth Daily.    Yes Provider, MD Harlan   amoxicillin-clavulanate (AUGMENTIN) 875-125 MG per tablet Take 1 tablet by mouth 2 (Two) Times a Day. 10/4/18   Tremayne Villalobos PA   ibuprofen (ADVIL,MOTRIN) 600 MG tablet Take 1 tablet by mouth Every 8 (Eight) Hours As Needed for Mild Pain . 10/4/18   Tremayne Villalobos PA       Past Medical History: Past Medical History:   Diagnosis Date   • Abnormal Pap smear of cervix    • Anxiety    • Asthma    • Bipolar disorder (CMS/HCC)    • Depression    • Endometriosis    • History of chicken pox    • Ovarian cyst    • Schizophrenia (CMS/HCC)       Past Surgical History Past Surgical History:   Procedure Laterality Date   • CHOLECYSTECTOMY     • LAPAROSCOPIC CHOLECYSTECTOMY     • WISDOM TOOTH EXTRACTION        Family History: Family History   Problem Relation Age of Onset   • Nephritis Father    • Hypertension Mother    • Mental retardation Brother    • No Known Problems Sister    • Lupus Paternal Grandfather    • Breast cancer Paternal Grandmother    • Dementia Maternal Grandmother    • No Known Problems Maternal Grandfather    • No Known Problems Brother    • Breast cancer Paternal Aunt       Social History:  reports that she has been smoking cigarettes.  She has been smoking about 0.50 packs per day. She has never used smokeless tobacco.   reports that she does not drink alcohol.   reports that she uses drugs. Drug: Marijuana.        Review of Systems                                                                                                                  Neuro no history of brain tumor    HENT no history of ear tumors    Eye no history of retinal tumors    Pulmonary no history of lung tumors    Cardiac no history of cardiac tumors    GI: No history of small bowel tumors    Musculoskeletal: No history of skeletal muscle tumors    Endocrine: No history of adrenal tumors    Lymphatic: No history of Hodgkin's disease    Renal: No history of renal cancer      Objective     Documented Vitals     10/19/19 2319 10/19/19 2324 10/19/19 2329 10/19/19 233   BP:       Pulse: 106 104 105 105   Resp:       Temp:       SpO2: 97% 98% 98% 97%          OBGyn Exam  Constitutional: Appears to be in no acute distress; Eyes: sclera normal; Endocrine system: thyroid palpate is normal; Pulmonary system: lungs clear; Cardiovascular system: heart regular rate and rhythm; Gastrointestinal system: abdomen soft nontender, active bowel sounds; Urologic system: CVA negative; Psychiatric: appropriate insight; Neurologic: gait within normal limits cervical exam by nursing external office open fingertip      Last Labs  Lab Results   Component Value Date    WBC 12.40 (H) 2019    RBC 4.37 2019    HGB 12.6 2019    HCT 38.2 2019    MCV 87.4 2019    MCH 28.8 2019    MCHC 33.0 2019    RDW 13.3 2019    RDWSD 42.4 2019    MPV 9.2 2019     2019        Lab Results   Component Value Date    GLUCOSE 82 2019    BUN 9 2019    CREATININE 0.52 (L) 2019     2019    K 3.7 2019     2019    CO2 24.0 2019    CALCIUM 9.4 2019    PROTEINTOT 7.1 2019    ALBUMIN 4.00 2019    ALT 12 2019    AST 13 2019    ALKPHOS 68 2019    BILITOT 0.3 2019    EGFRIFNONA 145 2019    GLOB 3.1 2019    AGRATIO 1.3 2019    BCR 17.3 2019    ANIONGAP 11.0 2019     .  Review of images from cervical length seem to be reassuring vital report pending  No results found for: HCGQUAL      Assessment/Plan:  1. 24 y.o. [id number].  Patient with crampy lower abdominal pain consistent  contractions.  Cervical length reassuring about risk  delivery feel stable for discharge    Astrid Taveras and I have discussed pain goals for this hospitalization after reviewing her current clinical condition, medical history and prior pain experiences.  The goal is to keep her pain level 0.  To  help achieve this, I plan to multimodal.       This document has been electronically signed by Garo Flores MD on October 20, 2019 9:52 AM\    Please note that portions of this note were completed with a voice recognition program.

## 2019-10-20 NOTE — NURSING NOTE
Dr. Flores at bedside. Informed patient of ultrasound results being WNL. Patient may be discharged home. Patient should keep scheduled follow up appointment at the end of the week with her provider. Continue Metrogel at home nightly for bacterial vaginosis.     Patient verbalized agreement and understanding.

## 2019-10-20 NOTE — NON STRESS TEST
Astrid Taveras, a  at 24w4d with an LULY of 2020, by Last Menstrual Period, was seen at [facility] LABOR DELIVERY for a nonstress test.    Chief Complaint   Patient presents with   • Abdominal Cramping     cramping started about an hour ago       There is no problem list on file for this patient.      Start Time: 2320  Stop Time: 2340    Pt arrives c/o lower abdominal pain and cramping x1 hour. No bleeding or leaking of fluid noted. Vaginosis panel collected, positive for BV. U/A 3+ leukocytes. SVE finger tip-1cm, provider notified. OB limited ultrasound with cervical length for AM. Continue to monitor   contractions second trimester
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
## 2019-10-20 NOTE — NURSING NOTE
Dr. Flores notified that patient has returned from ultrasound. Cervical length: 4 cm   MARIELENA: 17  Placenta: normal     MD states patient may be discharge home and he will come to unit to speak with patient now

## 2019-10-20 NOTE — NURSING NOTE
Discharge instructions reviewed with patient. Discharge AVS given to patient. Patient verbalized understanding. Patient ambulated to personal vehicle independently.

## 2019-10-21 LAB — BACTERIA SPEC AEROBE CULT: NORMAL

## 2019-12-01 ENCOUNTER — HOSPITAL ENCOUNTER (OUTPATIENT)
Facility: HOSPITAL | Age: 24
Discharge: HOME | End: 2019-12-01
Attending: OBSTETRICS & GYNECOLOGY | Admitting: OBSTETRICS & GYNECOLOGY

## 2019-12-01 VITALS
RESPIRATION RATE: 16 BRPM | OXYGEN SATURATION: 97 % | TEMPERATURE: 97.8 F | SYSTOLIC BLOOD PRESSURE: 125 MMHG | HEART RATE: 106 BPM | DIASTOLIC BLOOD PRESSURE: 62 MMHG

## 2019-12-01 PROCEDURE — G0463 HOSPITAL OUTPT CLINIC VISIT: HCPCS

## 2019-12-01 PROCEDURE — 59025 FETAL NON-STRESS TEST: CPT | Performed by: OBSTETRICS & GYNECOLOGY

## 2019-12-01 PROCEDURE — 59025 FETAL NON-STRESS TEST: CPT

## 2019-12-01 RX ORDER — PANTOPRAZOLE SODIUM 20 MG/1
20 TABLET, DELAYED RELEASE ORAL 2 TIMES DAILY
COMMUNITY
Start: 2019-11-20 | End: 2020-07-27

## 2019-12-02 NOTE — NON STRESS TEST
Astrid Taveras, a  at 30w4d with an LULY of 2020, by Last Menstrual Period, was seen at [facility] LABOR DELIVERY for a nonstress test.    Chief Complaint   Patient presents with   • Vaginal Bleeding     Patient states that she has been spotting for the past 2-3 days and has been having somepelvic pain as well.  Patient also reports that the baby hasnt been as active as usual.       There is no problem list on file for this patient.      Start Time:   Stop Time:     Interpretation A  Nonstress Test Interpretation A: Reactive (19 : Khushbu Christianson, RN)  Comments A: reviewed with Artie CROCKER (19 : Khushbu Christianson, RN)      Reactive NST, SVE with no change and no bleeding, patient reports having intercourse which corresponded to the onset on the spotting.  Patient with vaginal spotting after intercourse third trimester.  Examination by staff shows no bleeding now not shoshana no pain NST reactive felt stated she and baby stable for discharge.  Rh+

## 2019-12-05 ENCOUNTER — HOSPITAL ENCOUNTER (OUTPATIENT)
Facility: HOSPITAL | Age: 24
Discharge: HOME | End: 2019-12-06
Attending: OBSTETRICS & GYNECOLOGY | Admitting: OBSTETRICS & GYNECOLOGY

## 2019-12-06 VITALS
HEART RATE: 113 BPM | RESPIRATION RATE: 16 BRPM | TEMPERATURE: 98.3 F | SYSTOLIC BLOOD PRESSURE: 128 MMHG | DIASTOLIC BLOOD PRESSURE: 67 MMHG | OXYGEN SATURATION: 98 %

## 2019-12-06 VITALS
WEIGHT: 171 LBS | RESPIRATION RATE: 18 BRPM | SYSTOLIC BLOOD PRESSURE: 126 MMHG | TEMPERATURE: 97.9 F | HEART RATE: 100 BPM | DIASTOLIC BLOOD PRESSURE: 64 MMHG | OXYGEN SATURATION: 98 % | BODY MASS INDEX: 25.33 KG/M2 | HEIGHT: 69 IN

## 2019-12-06 PROCEDURE — 25010000002 ONDANSETRON PER 1 MG: Performed by: OBSTETRICS & GYNECOLOGY

## 2019-12-06 PROCEDURE — G0463 HOSPITAL OUTPT CLINIC VISIT: HCPCS

## 2019-12-06 PROCEDURE — 59025 FETAL NON-STRESS TEST: CPT | Performed by: OBSTETRICS & GYNECOLOGY

## 2019-12-06 PROCEDURE — 96374 THER/PROPH/DIAG INJ IV PUSH: CPT

## 2019-12-06 PROCEDURE — 59025 FETAL NON-STRESS TEST: CPT

## 2019-12-06 RX ORDER — ONDANSETRON 2 MG/ML
4 INJECTION INTRAMUSCULAR; INTRAVENOUS ONCE
Status: COMPLETED | OUTPATIENT
Start: 2019-12-06 | End: 2019-12-06

## 2019-12-06 RX ORDER — FAMOTIDINE 20 MG/1
20 TABLET, FILM COATED ORAL
Status: DISCONTINUED | OUTPATIENT
Start: 2019-12-06 | End: 2019-12-06 | Stop reason: HOSPADM

## 2019-12-06 RX ORDER — ONDANSETRON 4 MG/1
4 TABLET, FILM COATED ORAL EVERY 8 HOURS PRN
Qty: 30 TABLET | Refills: 1 | Status: SHIPPED | OUTPATIENT
Start: 2019-12-06 | End: 2020-07-27

## 2019-12-06 RX ORDER — SODIUM CHLORIDE, SODIUM LACTATE, POTASSIUM CHLORIDE, CALCIUM CHLORIDE 600; 310; 30; 20 MG/100ML; MG/100ML; MG/100ML; MG/100ML
1000 INJECTION, SOLUTION INTRAVENOUS CONTINUOUS
Status: DISCONTINUED | OUTPATIENT
Start: 2019-12-06 | End: 2019-12-06 | Stop reason: HOSPADM

## 2019-12-06 RX ORDER — CYCLOBENZAPRINE HCL 10 MG
10 TABLET ORAL ONCE
Status: COMPLETED | OUTPATIENT
Start: 2019-12-06 | End: 2019-12-06

## 2019-12-06 RX ADMIN — ONDANSETRON 4 MG: 2 INJECTION INTRAMUSCULAR; INTRAVENOUS at 09:58

## 2019-12-06 RX ADMIN — SODIUM CHLORIDE, POTASSIUM CHLORIDE, SODIUM LACTATE AND CALCIUM CHLORIDE 1000 ML/HR: 600; 310; 30; 20 INJECTION, SOLUTION INTRAVENOUS at 09:21

## 2019-12-06 RX ADMIN — FAMOTIDINE 20 MG: 20 TABLET ORAL at 07:47

## 2019-12-06 RX ADMIN — CYCLOBENZAPRINE HYDROCHLORIDE 10 MG: 10 TABLET, FILM COATED ORAL at 13:04

## 2019-12-06 NOTE — DISCHARGE INSTRUCTIONS
Return to labor and delivery for regular contractions every 2-3 mins for 2 hours, if your water breaks, vaginal bleeding, decreased fetal movement.  Keep next scheduled appt and call [phone] for any concerns or problems.        Report severe abdominal pain, vaginal bleeding or decreased fetal movement
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
## 2019-12-06 NOTE — DISCHARGE INSTRUCTIONS
Return to hospital for regular painful contractions, bright red bleeding, decreased fetal movement, or water breaks.     Call your doctor's office in the morning for follow up.     Call office or on call OB with any questions or concerns.
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
## 2020-07-27 PROBLEM — Z34.90 PREGNANCY: Status: RESOLVED | Noted: 2020-02-12 | Resolved: 2020-07-27

## 2020-07-27 PROBLEM — Z34.90 PREGNANCY: Status: ACTIVE | Noted: 2020-02-12

## 2021-10-01 ENCOUNTER — HOSPITAL ENCOUNTER (OUTPATIENT)
Facility: HOSPITAL | Age: 26
Setting detail: OBSERVATION
Discharge: HOME | End: 2021-10-03
Attending: EMERGENCY MEDICINE | Admitting: HOSPITALIST

## 2021-10-01 ENCOUNTER — APPOINTMENT (OUTPATIENT)
Dept: GENERAL RADIOLOGY | Facility: HOSPITAL | Age: 26
End: 2021-10-01

## 2021-10-01 ENCOUNTER — APPOINTMENT (OUTPATIENT)
Dept: CT IMAGING | Facility: HOSPITAL | Age: 26
End: 2021-10-01

## 2021-10-01 DIAGNOSIS — R10.32 LEFT LOWER QUADRANT ABDOMINAL PAIN: ICD-10-CM

## 2021-10-01 DIAGNOSIS — N12 PYELONEPHRITIS: Primary | ICD-10-CM

## 2021-10-01 DIAGNOSIS — A41.9 SEPSIS WITHOUT ACUTE ORGAN DYSFUNCTION, DUE TO UNSPECIFIED ORGANISM (HCC): ICD-10-CM

## 2021-10-01 LAB
ALBUMIN SERPL-MCNC: 4.7 G/DL (ref 3.5–5.2)
ALBUMIN/GLOB SERPL: 1.7 G/DL
ALP SERPL-CCNC: 85 U/L (ref 39–117)
ALT SERPL W P-5'-P-CCNC: 37 U/L (ref 1–33)
ANION GAP SERPL CALCULATED.3IONS-SCNC: 9 MMOL/L (ref 5–15)
AST SERPL-CCNC: 19 U/L (ref 1–32)
BACTERIA UR QL AUTO: ABNORMAL /HPF
BASOPHILS # BLD AUTO: 0.04 10*3/MM3 (ref 0–0.2)
BASOPHILS NFR BLD AUTO: 0.2 % (ref 0–1.5)
BILIRUB SERPL-MCNC: 0.2 MG/DL (ref 0–1.2)
BILIRUB UR QL STRIP: NEGATIVE
BUN SERPL-MCNC: 12 MG/DL (ref 6–20)
BUN/CREAT SERPL: 15.8 (ref 7–25)
CALCIUM SPEC-SCNC: 9.3 MG/DL (ref 8.6–10.5)
CHLORIDE SERPL-SCNC: 99 MMOL/L (ref 98–107)
CLARITY UR: ABNORMAL
CO2 SERPL-SCNC: 28 MMOL/L (ref 22–29)
COLOR UR: YELLOW
CREAT SERPL-MCNC: 0.76 MG/DL (ref 0.57–1)
D-LACTATE SERPL-SCNC: 1.3 MMOL/L (ref 0.5–2)
DEPRECATED RDW RBC AUTO: 45.6 FL (ref 37–54)
EOSINOPHIL # BLD AUTO: 0.13 10*3/MM3 (ref 0–0.4)
EOSINOPHIL NFR BLD AUTO: 0.7 % (ref 0.3–6.2)
ERYTHROCYTE [DISTWIDTH] IN BLOOD BY AUTOMATED COUNT: 13.6 % (ref 12.3–15.4)
FLUAV SUBTYP SPEC NAA+PROBE: NOT DETECTED
FLUBV RNA ISLT QL NAA+PROBE: NOT DETECTED
GFR SERPL CREATININE-BSD FRML MDRD: 92 ML/MIN/1.73
GLOBULIN UR ELPH-MCNC: 2.8 GM/DL
GLUCOSE SERPL-MCNC: 111 MG/DL (ref 65–99)
GLUCOSE UR STRIP-MCNC: NEGATIVE MG/DL
HCG SERPL QL: NEGATIVE
HCT VFR BLD AUTO: 38.4 % (ref 34–46.6)
HGB BLD-MCNC: 12.6 G/DL (ref 12–15.9)
HGB UR QL STRIP.AUTO: NEGATIVE
HYALINE CASTS UR QL AUTO: ABNORMAL /LPF
IMM GRANULOCYTES # BLD AUTO: 0.1 10*3/MM3 (ref 0–0.05)
IMM GRANULOCYTES NFR BLD AUTO: 0.6 % (ref 0–0.5)
KETONES UR QL STRIP: NEGATIVE
LEUKOCYTE ESTERASE UR QL STRIP.AUTO: ABNORMAL
LIPASE SERPL-CCNC: 23 U/L (ref 13–60)
LYMPHOCYTES # BLD AUTO: 2.45 10*3/MM3 (ref 0.7–3.1)
LYMPHOCYTES NFR BLD AUTO: 13.8 % (ref 19.6–45.3)
MCH RBC QN AUTO: 30.3 PG (ref 26.6–33)
MCHC RBC AUTO-ENTMCNC: 32.8 G/DL (ref 31.5–35.7)
MCV RBC AUTO: 92.3 FL (ref 79–97)
MONOCYTES # BLD AUTO: 1.29 10*3/MM3 (ref 0.1–0.9)
MONOCYTES NFR BLD AUTO: 7.3 % (ref 5–12)
NEUTROPHILS NFR BLD AUTO: 13.76 10*3/MM3 (ref 1.7–7)
NEUTROPHILS NFR BLD AUTO: 77.4 % (ref 42.7–76)
NITRITE UR QL STRIP: NEGATIVE
NRBC BLD AUTO-RTO: 0 /100 WBC (ref 0–0.2)
PH UR STRIP.AUTO: 8 [PH] (ref 5–9)
PLATELET # BLD AUTO: 266 10*3/MM3 (ref 140–450)
PMV BLD AUTO: 8.7 FL (ref 6–12)
POTASSIUM SERPL-SCNC: 3.9 MMOL/L (ref 3.5–5.2)
PROT SERPL-MCNC: 7.5 G/DL (ref 6–8.5)
PROT UR QL STRIP: ABNORMAL
RBC # BLD AUTO: 4.16 10*6/MM3 (ref 3.77–5.28)
RBC # UR: ABNORMAL /HPF
REF LAB TEST METHOD: ABNORMAL
SARS-COV-2 RNA PNL SPEC NAA+PROBE: NOT DETECTED
SODIUM SERPL-SCNC: 136 MMOL/L (ref 136–145)
SP GR UR STRIP: 1.02 (ref 1–1.03)
SQUAMOUS #/AREA URNS HPF: ABNORMAL /HPF
UROBILINOGEN UR QL STRIP: ABNORMAL
WBC # BLD AUTO: 17.77 10*3/MM3 (ref 3.4–10.8)
WBC UR QL AUTO: ABNORMAL /HPF

## 2021-10-01 PROCEDURE — 87661 TRICHOMONAS VAGINALIS AMPLIF: CPT | Performed by: EMERGENCY MEDICINE

## 2021-10-01 PROCEDURE — 81001 URINALYSIS AUTO W/SCOPE: CPT | Performed by: EMERGENCY MEDICINE

## 2021-10-01 PROCEDURE — 74176 CT ABD & PELVIS W/O CONTRAST: CPT

## 2021-10-01 PROCEDURE — 83605 ASSAY OF LACTIC ACID: CPT | Performed by: EMERGENCY MEDICINE

## 2021-10-01 PROCEDURE — 87591 N.GONORRHOEAE DNA AMP PROB: CPT | Performed by: EMERGENCY MEDICINE

## 2021-10-01 PROCEDURE — 87491 CHLMYD TRACH DNA AMP PROBE: CPT | Performed by: EMERGENCY MEDICINE

## 2021-10-01 PROCEDURE — 96361 HYDRATE IV INFUSION ADD-ON: CPT

## 2021-10-01 PROCEDURE — 87480 CANDIDA DNA DIR PROBE: CPT | Performed by: EMERGENCY MEDICINE

## 2021-10-01 PROCEDURE — 81001 URINALYSIS AUTO W/SCOPE: CPT | Performed by: INTERNAL MEDICINE

## 2021-10-01 PROCEDURE — 99285 EMERGENCY DEPT VISIT HI MDM: CPT

## 2021-10-01 PROCEDURE — 87077 CULTURE AEROBIC IDENTIFY: CPT | Performed by: INTERNAL MEDICINE

## 2021-10-01 PROCEDURE — 80306 DRUG TEST PRSMV INSTRMNT: CPT | Performed by: INTERNAL MEDICINE

## 2021-10-01 PROCEDURE — 25010000002 ONDANSETRON PER 1 MG: Performed by: EMERGENCY MEDICINE

## 2021-10-01 PROCEDURE — G0378 HOSPITAL OBSERVATION PER HR: HCPCS

## 2021-10-01 PROCEDURE — 96375 TX/PRO/DX INJ NEW DRUG ADDON: CPT

## 2021-10-01 PROCEDURE — 85025 COMPLETE CBC W/AUTO DIFF WBC: CPT | Performed by: EMERGENCY MEDICINE

## 2021-10-01 PROCEDURE — 87636 SARSCOV2 & INF A&B AMP PRB: CPT | Performed by: EMERGENCY MEDICINE

## 2021-10-01 PROCEDURE — 96367 TX/PROPH/DG ADDL SEQ IV INF: CPT

## 2021-10-01 PROCEDURE — 96365 THER/PROPH/DIAG IV INF INIT: CPT

## 2021-10-01 PROCEDURE — 87186 SC STD MICRODIL/AGAR DIL: CPT | Performed by: INTERNAL MEDICINE

## 2021-10-01 PROCEDURE — 80053 COMPREHEN METABOLIC PANEL: CPT | Performed by: EMERGENCY MEDICINE

## 2021-10-01 PROCEDURE — 74022 RADEX COMPL AQT ABD SERIES: CPT

## 2021-10-01 PROCEDURE — 87040 BLOOD CULTURE FOR BACTERIA: CPT | Performed by: EMERGENCY MEDICINE

## 2021-10-01 PROCEDURE — 87510 GARDNER VAG DNA DIR PROBE: CPT | Performed by: EMERGENCY MEDICINE

## 2021-10-01 PROCEDURE — 87660 TRICHOMONAS VAGIN DIR PROBE: CPT | Performed by: EMERGENCY MEDICINE

## 2021-10-01 PROCEDURE — P9612 CATHETERIZE FOR URINE SPEC: HCPCS

## 2021-10-01 PROCEDURE — 83690 ASSAY OF LIPASE: CPT | Performed by: EMERGENCY MEDICINE

## 2021-10-01 PROCEDURE — C9803 HOPD COVID-19 SPEC COLLECT: HCPCS

## 2021-10-01 PROCEDURE — 87086 URINE CULTURE/COLONY COUNT: CPT | Performed by: INTERNAL MEDICINE

## 2021-10-01 PROCEDURE — 84703 CHORIONIC GONADOTROPIN ASSAY: CPT | Performed by: EMERGENCY MEDICINE

## 2021-10-01 RX ORDER — TRAMADOL HYDROCHLORIDE 50 MG/1
50 TABLET ORAL EVERY 6 HOURS PRN
Status: DISCONTINUED | OUTPATIENT
Start: 2021-10-01 | End: 2021-10-03 | Stop reason: HOSPADM

## 2021-10-01 RX ORDER — ONDANSETRON 2 MG/ML
4 INJECTION INTRAMUSCULAR; INTRAVENOUS ONCE
Status: COMPLETED | OUTPATIENT
Start: 2021-10-01 | End: 2021-10-01

## 2021-10-01 RX ORDER — SODIUM CHLORIDE 0.9 % (FLUSH) 0.9 %
10 SYRINGE (ML) INJECTION EVERY 12 HOURS SCHEDULED
Status: DISCONTINUED | OUTPATIENT
Start: 2021-10-01 | End: 2021-10-03 | Stop reason: HOSPADM

## 2021-10-01 RX ORDER — ACETAMINOPHEN 325 MG/1
650 TABLET ORAL EVERY 6 HOURS PRN
Status: DISCONTINUED | OUTPATIENT
Start: 2021-10-01 | End: 2021-10-03 | Stop reason: HOSPADM

## 2021-10-01 RX ORDER — SODIUM CHLORIDE 9 MG/ML
125 INJECTION, SOLUTION INTRAVENOUS CONTINUOUS
Status: DISCONTINUED | OUTPATIENT
Start: 2021-10-01 | End: 2021-10-02

## 2021-10-01 RX ORDER — IPRATROPIUM BROMIDE AND ALBUTEROL SULFATE 2.5; .5 MG/3ML; MG/3ML
3 SOLUTION RESPIRATORY (INHALATION) EVERY 6 HOURS PRN
Status: DISCONTINUED | OUTPATIENT
Start: 2021-10-01 | End: 2021-10-03 | Stop reason: HOSPADM

## 2021-10-01 RX ORDER — METOPROLOL SUCCINATE 25 MG
12.5 TABLET, EXTENDED RELEASE 24 HR ORAL
Status: DISCONTINUED | OUTPATIENT
Start: 2021-10-02 | End: 2021-10-03 | Stop reason: HOSPADM

## 2021-10-01 RX ORDER — SODIUM CHLORIDE 0.9 % (FLUSH) 0.9 %
10 SYRINGE (ML) INJECTION AS NEEDED
Status: DISCONTINUED | OUTPATIENT
Start: 2021-10-01 | End: 2021-10-03 | Stop reason: HOSPADM

## 2021-10-01 RX ORDER — ACETAMINOPHEN 325 MG/1
650 TABLET ORAL ONCE
Status: COMPLETED | OUTPATIENT
Start: 2021-10-01 | End: 2021-10-01

## 2021-10-01 RX ORDER — SODIUM CHLORIDE, SODIUM LACTATE, POTASSIUM CHLORIDE, CALCIUM CHLORIDE 600; 310; 30; 20 MG/100ML; MG/100ML; MG/100ML; MG/100ML
125 INJECTION, SOLUTION INTRAVENOUS CONTINUOUS
Status: DISCONTINUED | OUTPATIENT
Start: 2021-10-01 | End: 2021-10-03 | Stop reason: HOSPADM

## 2021-10-01 RX ORDER — MORPHINE SULFATE 2 MG/ML
2 INJECTION, SOLUTION INTRAMUSCULAR; INTRAVENOUS ONCE
Status: DISCONTINUED | OUTPATIENT
Start: 2021-10-01 | End: 2021-10-03 | Stop reason: HOSPADM

## 2021-10-01 RX ADMIN — ONDANSETRON HYDROCHLORIDE 4 MG: 2 INJECTION, SOLUTION INTRAMUSCULAR; INTRAVENOUS at 21:10

## 2021-10-01 RX ADMIN — METRONIDAZOLE 500 MG: 500 INJECTION, SOLUTION INTRAVENOUS at 21:46

## 2021-10-01 RX ADMIN — ACETAMINOPHEN 650 MG: 325 TABLET, FILM COATED ORAL at 20:31

## 2021-10-01 RX ADMIN — SODIUM CHLORIDE 125 ML/HR: 900 INJECTION, SOLUTION INTRAVENOUS at 20:40

## 2021-10-01 RX ADMIN — SODIUM CHLORIDE 1000 ML: 9 INJECTION, SOLUTION INTRAVENOUS at 20:31

## 2021-10-01 RX ADMIN — AZTREONAM 1 G: 1 INJECTION, POWDER, LYOPHILIZED, FOR SOLUTION INTRAMUSCULAR; INTRAVENOUS at 23:45

## 2021-10-02 LAB
ALBUMIN SERPL-MCNC: 3.7 G/DL (ref 3.5–5.2)
ALBUMIN/GLOB SERPL: 1.3 G/DL
ALP SERPL-CCNC: 76 U/L (ref 39–117)
ALT SERPL W P-5'-P-CCNC: 31 U/L (ref 1–33)
AMPHET+METHAMPHET UR QL: NEGATIVE
AMPHETAMINES UR QL: NEGATIVE
ANION GAP SERPL CALCULATED.3IONS-SCNC: 6 MMOL/L (ref 5–15)
AST SERPL-CCNC: 16 U/L (ref 1–32)
BACTERIA UR QL AUTO: ABNORMAL /HPF
BARBITURATES UR QL SCN: NEGATIVE
BENZODIAZ UR QL SCN: NEGATIVE
BILIRUB SERPL-MCNC: 0.3 MG/DL (ref 0–1.2)
BILIRUB UR QL STRIP: NEGATIVE
BUN SERPL-MCNC: 10 MG/DL (ref 6–20)
BUN/CREAT SERPL: 16.7 (ref 7–25)
BUPRENORPHINE SERPL-MCNC: NEGATIVE NG/ML
CALCIUM SPEC-SCNC: 8.5 MG/DL (ref 8.6–10.5)
CANDIDA ALBICANS: NEGATIVE
CANNABINOIDS SERPL QL: NEGATIVE
CHLORIDE SERPL-SCNC: 104 MMOL/L (ref 98–107)
CLARITY UR: ABNORMAL
CO2 SERPL-SCNC: 25 MMOL/L (ref 22–29)
COCAINE UR QL: NEGATIVE
COLOR UR: YELLOW
CREAT SERPL-MCNC: 0.6 MG/DL (ref 0.57–1)
DEPRECATED RDW RBC AUTO: 45.8 FL (ref 37–54)
ERYTHROCYTE [DISTWIDTH] IN BLOOD BY AUTOMATED COUNT: 13.5 % (ref 12.3–15.4)
GARDNERELLA VAGINALIS: NEGATIVE
GFR SERPL CREATININE-BSD FRML MDRD: 121 ML/MIN/1.73
GLOBULIN UR ELPH-MCNC: 2.8 GM/DL
GLUCOSE SERPL-MCNC: 93 MG/DL (ref 65–99)
GLUCOSE UR STRIP-MCNC: NEGATIVE MG/DL
HBA1C MFR BLD: 5 % (ref 4.8–5.6)
HCT VFR BLD AUTO: 34.8 % (ref 34–46.6)
HGB BLD-MCNC: 11.5 G/DL (ref 12–15.9)
HGB UR QL STRIP.AUTO: NEGATIVE
HYALINE CASTS UR QL AUTO: ABNORMAL /LPF
KETONES UR QL STRIP: NEGATIVE
LEUKOCYTE ESTERASE UR QL STRIP.AUTO: ABNORMAL
MAGNESIUM SERPL-MCNC: 2.1 MG/DL (ref 1.6–2.6)
MCH RBC QN AUTO: 30.6 PG (ref 26.6–33)
MCHC RBC AUTO-ENTMCNC: 33 G/DL (ref 31.5–35.7)
MCV RBC AUTO: 92.6 FL (ref 79–97)
METHADONE UR QL SCN: NEGATIVE
NITRITE UR QL STRIP: NEGATIVE
OPIATES UR QL: NEGATIVE
OXYCODONE UR QL SCN: NEGATIVE
PCP UR QL SCN: NEGATIVE
PH UR STRIP.AUTO: 8 [PH] (ref 5–9)
PLATELET # BLD AUTO: 198 10*3/MM3 (ref 140–450)
PMV BLD AUTO: 8.9 FL (ref 6–12)
POTASSIUM SERPL-SCNC: 3.9 MMOL/L (ref 3.5–5.2)
PROPOXYPH UR QL: NEGATIVE
PROT SERPL-MCNC: 6.5 G/DL (ref 6–8.5)
PROT UR QL STRIP: ABNORMAL
RBC # BLD AUTO: 3.76 10*6/MM3 (ref 3.77–5.28)
RBC # UR: ABNORMAL /HPF
REF LAB TEST METHOD: ABNORMAL
SODIUM SERPL-SCNC: 135 MMOL/L (ref 136–145)
SP GR UR STRIP: 1.02 (ref 1–1.03)
SQUAMOUS #/AREA URNS HPF: ABNORMAL /HPF
T VAGINALIS DNA VAG QL PROBE+SIG AMP: NEGATIVE
T4 FREE SERPL-MCNC: 0.82 NG/DL (ref 0.93–1.7)
TRICYCLICS UR QL SCN: NEGATIVE
TSH SERPL DL<=0.05 MIU/L-ACNC: 1.58 UIU/ML (ref 0.27–4.2)
UROBILINOGEN UR QL STRIP: ABNORMAL
VANCOMYCIN TROUGH SERPL-MCNC: <4 MCG/ML (ref 5–20)
WBC # BLD AUTO: 14.99 10*3/MM3 (ref 3.4–10.8)
WBC UR QL AUTO: ABNORMAL /HPF

## 2021-10-02 PROCEDURE — G0378 HOSPITAL OBSERVATION PER HR: HCPCS

## 2021-10-02 PROCEDURE — 36415 COLL VENOUS BLD VENIPUNCTURE: CPT | Performed by: INTERNAL MEDICINE

## 2021-10-02 PROCEDURE — 83735 ASSAY OF MAGNESIUM: CPT | Performed by: INTERNAL MEDICINE

## 2021-10-02 PROCEDURE — 96361 HYDRATE IV INFUSION ADD-ON: CPT

## 2021-10-02 PROCEDURE — 96366 THER/PROPH/DIAG IV INF ADDON: CPT

## 2021-10-02 PROCEDURE — 80202 ASSAY OF VANCOMYCIN: CPT | Performed by: INTERNAL MEDICINE

## 2021-10-02 PROCEDURE — 83036 HEMOGLOBIN GLYCOSYLATED A1C: CPT | Performed by: INTERNAL MEDICINE

## 2021-10-02 PROCEDURE — 84439 ASSAY OF FREE THYROXINE: CPT | Performed by: INTERNAL MEDICINE

## 2021-10-02 PROCEDURE — 80050 GENERAL HEALTH PANEL: CPT | Performed by: INTERNAL MEDICINE

## 2021-10-02 PROCEDURE — 25010000002 VANCOMYCIN 1 G RECONSTITUTED SOLUTION: Performed by: EMERGENCY MEDICINE

## 2021-10-02 PROCEDURE — 96367 TX/PROPH/DG ADDL SEQ IV INF: CPT

## 2021-10-02 RX ORDER — BISACODYL 5 MG/1
5 TABLET, DELAYED RELEASE ORAL DAILY PRN
Status: DISCONTINUED | OUTPATIENT
Start: 2021-10-02 | End: 2021-10-03 | Stop reason: HOSPADM

## 2021-10-02 RX ORDER — AMOXICILLIN 250 MG
2 CAPSULE ORAL 2 TIMES DAILY
Status: DISCONTINUED | OUTPATIENT
Start: 2021-10-02 | End: 2021-10-03 | Stop reason: HOSPADM

## 2021-10-02 RX ORDER — POLYETHYLENE GLYCOL 3350 17 G/17G
17 POWDER, FOR SOLUTION ORAL DAILY PRN
Status: DISCONTINUED | OUTPATIENT
Start: 2021-10-02 | End: 2021-10-03 | Stop reason: HOSPADM

## 2021-10-02 RX ORDER — AMOXICILLIN 250 MG
2 CAPSULE ORAL DAILY PRN
Status: DISCONTINUED | OUTPATIENT
Start: 2021-10-02 | End: 2021-10-02

## 2021-10-02 RX ORDER — BISACODYL 10 MG
10 SUPPOSITORY, RECTAL RECTAL DAILY PRN
Status: DISCONTINUED | OUTPATIENT
Start: 2021-10-02 | End: 2021-10-03 | Stop reason: HOSPADM

## 2021-10-02 RX ADMIN — ACETAMINOPHEN 650 MG: 325 TABLET, FILM COATED ORAL at 11:31

## 2021-10-02 RX ADMIN — Medication 10 ML: at 10:22

## 2021-10-02 RX ADMIN — ACETAMINOPHEN 650 MG: 325 TABLET, FILM COATED ORAL at 02:27

## 2021-10-02 RX ADMIN — SODIUM CHLORIDE, POTASSIUM CHLORIDE, SODIUM LACTATE AND CALCIUM CHLORIDE 125 ML/HR: 600; 310; 30; 20 INJECTION, SOLUTION INTRAVENOUS at 20:27

## 2021-10-02 RX ADMIN — VANCOMYCIN HYDROCHLORIDE 1000 MG: 1 INJECTION, POWDER, LYOPHILIZED, FOR SOLUTION INTRAVENOUS at 00:24

## 2021-10-02 RX ADMIN — DOCUSATE SODIUM 50 MG AND SENNOSIDES 8.6 MG 2 TABLET: 8.6; 5 TABLET, FILM COATED ORAL at 16:14

## 2021-10-02 RX ADMIN — AZTREONAM 2 G: 2 INJECTION, POWDER, LYOPHILIZED, FOR SOLUTION INTRAMUSCULAR; INTRAVENOUS at 16:53

## 2021-10-02 RX ADMIN — ACETAMINOPHEN 650 MG: 325 TABLET, FILM COATED ORAL at 20:25

## 2021-10-02 RX ADMIN — VANCOMYCIN HYDROCHLORIDE 1000 MG: 1 INJECTION, POWDER, LYOPHILIZED, FOR SOLUTION INTRAVENOUS at 13:57

## 2021-10-02 RX ADMIN — TRAMADOL HYDROCHLORIDE 50 MG: 50 TABLET, FILM COATED ORAL at 16:26

## 2021-10-02 RX ADMIN — AZTREONAM 2 G: 2 INJECTION, POWDER, LYOPHILIZED, FOR SOLUTION INTRAMUSCULAR; INTRAVENOUS at 07:25

## 2021-10-02 RX ADMIN — TRAMADOL HYDROCHLORIDE 50 MG: 50 TABLET, FILM COATED ORAL at 05:50

## 2021-10-02 RX ADMIN — Medication 12.5 MG: at 08:54

## 2021-10-02 RX ADMIN — SODIUM CHLORIDE, POTASSIUM CHLORIDE, SODIUM LACTATE AND CALCIUM CHLORIDE 125 ML/HR: 600; 310; 30; 20 INJECTION, SOLUTION INTRAVENOUS at 00:25

## 2021-10-02 NOTE — ED PROVIDER NOTES
"Subjective   27yo female pmh significant psvt/cholecystectomy presents ED c/o 1d hx acute onset \"sharp\" left flank pain/radiating LLQ/associated nausea/neg exac or relieve factors/associated fever.  Pt seen urgent care et referred to ED for further evaluation.  ROS neg chills/sore throat/cough/chest pain/soa/dysuria/vaginal bleeding/vaginal discharge/hematuria/diarrhea/constipation.      History provided by:  Patient  Abdominal Pain  Pain location:  L flank and LLQ  Pain quality: sharp    Associated symptoms: fever and nausea    Associated symptoms: no constipation, no diarrhea, no dysuria, no hematuria, no vaginal bleeding, no vaginal discharge and no vomiting        Review of Systems   Constitutional: Positive for fever.   HENT: Negative.    Respiratory: Negative.    Cardiovascular: Negative.    Gastrointestinal: Positive for abdominal pain and nausea. Negative for blood in stool, constipation, diarrhea and vomiting.   Genitourinary: Positive for flank pain. Negative for dysuria, hematuria, vaginal bleeding and vaginal discharge.   Musculoskeletal: Negative for back pain.   Skin: Negative.    Allergic/Immunologic: Negative for immunocompromised state.   All other systems reviewed and are negative.      Past Medical History:   Diagnosis Date   • Abnormal Pap smear of cervix    • Anxiety    • Asthma    • Bipolar disorder (HCC)    • Depression    • Endometriosis    • History of chicken pox    • Ovarian cyst    • Schizophrenia (HCC)        Allergies   Allergen Reactions   • Toradol [Ketorolac Tromethamine] Rash   • Penicillins Rash, Other (See Comments) and Itching     fever  Pt states she gets a fever       Past Surgical History:   Procedure Laterality Date   • CHOLECYSTECTOMY     • LAPAROSCOPIC CHOLECYSTECTOMY     • WISDOM TOOTH EXTRACTION         Family History   Problem Relation Age of Onset   • Nephritis Father    • Hypertension Mother    • Mental retardation Brother    • No Known Problems Sister    • Lupus " Paternal Grandfather    • Breast cancer Paternal Grandmother    • Dementia Maternal Grandmother    • No Known Problems Maternal Grandfather    • No Known Problems Brother    • Breast cancer Paternal Aunt        Social History     Socioeconomic History   • Marital status: Single     Spouse name: Not on file   • Number of children: Not on file   • Years of education: Not on file   • Highest education level: Not on file   Tobacco Use   • Smoking status: Current Every Day Smoker     Packs/day: 0.50     Types: Cigarettes   • Smokeless tobacco: Never Used   • Tobacco comment: 2-3 cigarettes per day   Substance and Sexual Activity   • Alcohol use: No   • Drug use: Yes     Types: Marijuana, Amphetamines, Methamphetamines   • Sexual activity: Yes     Partners: Male     Comment: last pap smear 2018- negative at dr. lane's office            Objective   Physical Exam  Vitals and nursing note reviewed. Exam conducted with a chaperone present.   Constitutional:       Appearance: Normal appearance.   HENT:      Head: Normocephalic and atraumatic.      Mouth/Throat:      Mouth: Mucous membranes are moist.   Eyes:      Pupils: Pupils are equal, round, and reactive to light.   Cardiovascular:      Rate and Rhythm: Regular rhythm. Tachycardia present.      Pulses: Normal pulses.      Heart sounds: Normal heart sounds. No murmur heard.   No friction rub. No gallop.    Pulmonary:      Effort: Pulmonary effort is normal. No respiratory distress.      Breath sounds: Normal breath sounds. No wheezing, rhonchi or rales.   Abdominal:      General: Abdomen is flat. Bowel sounds are normal.      Palpations: Abdomen is soft.      Tenderness: There is abdominal tenderness in the left lower quadrant. There is left CVA tenderness. There is no right CVA tenderness, guarding or rebound. Negative signs include Silvestre's sign and McBurney's sign.       Genitourinary:     Vagina: Normal. No vaginal discharge, tenderness or bleeding.      Cervix:  Normal.      Uterus: Normal.       Adnexa: Right adnexa normal and left adnexa normal.        Right: No tenderness.          Left: No tenderness.     Musculoskeletal:         General: Normal range of motion.      Cervical back: Normal range of motion and neck supple. No rigidity.   Lymphadenopathy:      Cervical: No cervical adenopathy.   Skin:     General: Skin is warm and dry.   Neurological:      General: No focal deficit present.      Mental Status: She is alert and oriented to person, place, and time.      GCS: GCS eye subscore is 4. GCS verbal subscore is 5. GCS motor subscore is 6.         Procedures           ED Course      Labs Reviewed   COMPREHENSIVE METABOLIC PANEL - Abnormal; Notable for the following components:       Result Value    Glucose 111 (*)     ALT (SGPT) 37 (*)     All other components within normal limits    Narrative:     GFR Normal >60  Chronic Kidney Disease <60  Kidney Failure <15     URINALYSIS W/ MICROSCOPIC IF INDICATED (NO CULTURE) - Abnormal; Notable for the following components:    Appearance, UA Cloudy (*)     Protein, UA Trace (*)     Leuk Esterase, UA Small (1+) (*)     All other components within normal limits   CBC WITH AUTO DIFFERENTIAL - Abnormal; Notable for the following components:    WBC 17.77 (*)     Neutrophil % 77.4 (*)     Lymphocyte % 13.8 (*)     Immature Grans % 0.6 (*)     Neutrophils, Absolute 13.76 (*)     Monocytes, Absolute 1.29 (*)     Immature Grans, Absolute 0.10 (*)     All other components within normal limits   URINALYSIS, MICROSCOPIC ONLY - Abnormal; Notable for the following components:    RBC, UA 3-5 (*)     WBC, UA 21-30 (*)     Squamous Epithelial Cells, UA 3-5 (*)     All other components within normal limits   LIPASE - Normal   HCG, SERUM, QUALITATIVE - Normal   LACTIC ACID, PLASMA - Normal   BLOOD CULTURE   BLOOD CULTURE   CHLAMYDIA TRACHOMATIS, NEISSERIA GONORRHOEAE, TRICHOMONAS VAGINALIS, PCR   ALEKSANDR ALBICANS, GARDNERELLA VAGINALIS,  TRICHOMONAS VAGINALIS,DNA   CBC AND DIFFERENTIAL    Narrative:     The following orders were created for panel order CBC & Differential.  Procedure                               Abnormality         Status                     ---------                               -----------         ------                     CBC Auto Differential[[id number]]        Abnormal            Final result                 Please view results for these tests on the individual orders.     CT Abdomen Pelvis Without Contrast    Result Date: 10/1/2021  Narrative: EXAM DESCRIPTION: CT ABDOMEN PELVIS WO CONTRAST CLINICAL HISTORY: 26 years Female, flank pain TECHNIQUE: Helical CT axial images are obtained from the lung bases to the pubic symphysis without IV contrast. No oral contrast was administered. Multiplanar reconstruction. This exam was performed according to our departmental dose-optimization program, which includes automated exposure control, adjustment of the mA and/or kV according to patient size and/or use of iterative reconstruction technique. COMPARISON: 4/14/2015 FINDINGS: LUNG BASES: No basilar consolidation or effusions. LIVER:  Normal in size. Normal attenuation. No focal masses. HEPATOBILIARY: Status post cholecystectomy.  No intra- or extrahepatic ductal dilatation. SPLEEN: Normal size. PANCREAS: Normal size and contour. No focal mass. ADRENAL GLANDS: Normal size. No adrenal masses. KIDNEYS:  Bilateral kidneys are normal in size without obstructing calculi or hydronephrosis. No nephrolithiasis. No significant cysts are present. BOWEL AND MESENTERY: Diffuse colonic fecal retention without bowel dilatation. No small or large bowel dilatation. No colonic diverticulosis. Normal appendix.  No abnormal mesenteric lymphadenopathy.  No free fluid or pneumoperitoneum. RETROPERITONEUM:Normal caliber abdominal aorta without aneurysm. No abnormal retroperitoneal lymphadenopathy. PELVIS:  Urinary bladder is unremarkable.  Uterus and adnexal  structures are unremarkable. ABDOMINAL WALL: The abdominal wall is intact. BONES: No suspicious osseous lytic or blastic lesions seen.     Impression: 1.  No nephroureterolithiasis or obstructive uropathy. 2.   No acute intra-abdominal or pelvic disease. Normal appendix. 3.  Diffuse colonic fecal retention without bowel dilatation; rule out constipation. 4.  Status post cholecystectomy. Electronically signed by:  Luis Conte MD  10/1/2021 9:26 PM CDT Workstation: [id number]    XR Abdomen 2+ VW with Chest 1 VW    Result Date: 10/1/2021  Narrative: XR ABDOMEN SUPINE AND ERECT WITH CHEST (ABD ACUTE SERIES) INDICATION: 26 years Female; abd pain TECHNIQUE: 2 views of the abdomen and single view of the chest were performed. Comparison: 7/24/2021. FINDINGS: Lungs/Pleura: No acute airspace opacities, pleural effusion, or pneumothorax. Heart/Mediastinum: Unremarkable. Bowel: Moderate stool throughout the colon. No bowel dilatation or free air. Calcifications: None. Lines/Tubes: None. Bones: Unremarkable. Soft tissues: Unremarkable. Incidental findings: Surgical clips are noted in the right upper abdomen from previous cholecystectomy.     Impression: 1.  No acute cardiopulmonary abnormality. 2.  Moderate stool throughout the colon. No bowel dilatation or free air. Electronically signed by:  Linda Shelley  10/1/2021 9:59 PM CDT Workstation: [id number]                                         [facility]    Final diagnoses:   Pyelonephritis   Sepsis without acute organ dysfunction, due to unspecified organism (HCC)   Left lower quadrant abdominal pain       ED Disposition  ED Disposition     ED Disposition Condition Comment    Decision to Admit  Level of Care: Med/Surg [1]   Admitting Physician: BEHROOZI, SAEID [[id number]]   Attending Physician: BEHROOZI, SAEID [[id number]]   Patient Class: Observation [104]            No follow-up provider specified.       Medication List      No changes were made to your prescriptions during this visit.           Ronald Ross MD  10/01/21 [id number]

## 2021-10-02 NOTE — H&P
[facility] Medicine Admission      Date of Admission: 10/1/2021      Primary Care Physician: Tyron Chery APRN      Chief Complaint: Left flank pain    HPI:  Patient is a 26-year-old female with known past medical history of nonspecific tachycardia for which she is on beta-blocker, metoprolol and dose was reduced concerning avoiding hypotension,, asthma, bipolar disorder, depression, anxiety, schizophrenia, ovarian cyst, endometriosis, cholecystectomy, substance abuse methamphetamine and marijuana with last substance use about 2 weeks ago resented to the ER with complaint of sharp left flank pain.  Patient underwent pelvic examination in ED to which was negative for signs symptoms of pelvic inflammatory disease.  Patient was given Flagyl and aztreonam in ED with history of penicillin allergy.  She was diagnosed with sepsis and urinary tract infection and hospitalist service was called for admission of the patient.  Patient was seen and examined in ED room 3.  Female friend at bedside.  Patient report last night she started having the left flank and later some suprapubic abdominal pain moderate in severity she had frequency but not dysuria she had night sweats, she had nausea.  He had fever in ED.  She denies any fall injury trauma headache sore throat cough congestion chest pain shortness of breath abdominal pain, URI-like symptoms, back pain.  Patient denies any history of frequent UTIs.  Patient is not sexually active recently her report and denies any particular pelvic problems..    Concurrent Medical History:  has a past medical history of Abnormal Pap smear of cervix, Anxiety, Asthma, Bipolar disorder (HCC), Depression, Endometriosis, History of chicken pox, Ovarian cyst, and Schizophrenia (Formerly [facility] [place]).    Past Surgical History:  has a past surgical history that includes Cholecystectomy; Stigler tooth extraction; and Laparoscopic cholecystectomy.    Family History: family  history includes Breast cancer in her paternal aunt and paternal grandmother; Dementia in her maternal grandmother; Hypertension in her mother; Lupus in her paternal grandfather; Mental retardation in her brother; Nephritis in her father; No Known Problems in her brother, maternal grandfather, and sister.     Social History:  reports that she has been smoking cigarettes. She has been smoking about 0.50 packs per day. She has never used smokeless tobacco. She reports current drug use. Drugs: Marijuana, Amphetamines, and Methamphetamines. She reports that she does not drink alcohol.    Allergies:   Allergies   Allergen Reactions   • Toradol [Ketorolac Tromethamine] Rash   • Penicillins Rash, Other (See Comments) and Itching     fever  Pt states she gets a fever       Medications:   Prior to Admission medications    Medication Sig Start Date End Date Taking? Authorizing Provider   albuterol sulfate  (90 Base) MCG/ACT inhaler Inhale 2 puffs 1 (One) Time As Needed for Wheezing or Shortness of Air.    Provider, MD Harlan   benzonatate (TESSALON) 200 MG capsule Take 1 capsule by mouth 3 (Three) Times a Day As Needed for Cough. 7/24/21   Isabel Barber APRN   guaiFENesin (Mucinex) 600 MG 12 hr tablet Take 2 tablets by mouth 2 (Two) Times a Day. 7/24/21   Isabel Barber APRN   levalbuterol (XOPENEX HFA) 45 MCG/ACT inhaler Inhale 1-2 puffs. 6/11/21 6/12/22  Emergency, Nurse Divine RN   methylPREDNISolone (MEDROL) 4 MG dose pack Dose pack. Take as directed on package instructions. 7/24/21   Isabel Barber APRN   metoprolol succinate XL (TOPROL-XL) 25 MG 24 hr tablet Take 1/2 tablet by mouth twice 6/11/21   Emergency, Nurse Divine RN       Review of Systems:  Review of Systems   Constitutional: Positive for fever. Negative for chills, diaphoresis and fatigue.   HENT: Negative for congestion, dental problem, ear pain, facial swelling, rhinorrhea and sinus pressure.    Eyes: Negative for photophobia, discharge,  redness, itching and visual disturbance.   Respiratory: Negative for apnea, cough, choking, chest tightness, shortness of breath, wheezing and stridor.    Cardiovascular: Negative for chest pain, palpitations and leg swelling.   Gastrointestinal: Positive for abdominal pain and nausea. Negative for abdominal distention, anal bleeding, blood in stool, diarrhea, rectal pain and vomiting.   Endocrine: Negative for cold intolerance, heat intolerance, polydipsia, polyphagia and polyuria.   Genitourinary: Positive for flank pain (left flank pain, mild). Negative for difficulty urinating, frequency, hematuria and urgency.        Frequency and left flank pain   Musculoskeletal: Negative for arthralgias, back pain, joint swelling and myalgias.   Skin: Negative for pallor, rash and wound.   Allergic/Immunologic: Negative for environmental allergies and immunocompromised state.   Neurological: Negative for dizziness, tremors, seizures, facial asymmetry, speech difficulty, weakness, light-headedness, numbness and headaches.   Hematological: Negative for adenopathy. Does not bruise/bleed easily.   Psychiatric/Behavioral: Negative for agitation, behavioral problems and hallucinations. The patient is not nervous/anxious.       Otherwise complete ROS is negative except as mentioned above.    Physical Exam:   Temp:  [100.4 °F (38 °C)-102.6 °F (39.2 °C)] 102.6 °F (39.2 °C)  Heart Rate:  [116-130] 116  Resp:  [18] 18  BP: ()/(50-65) 99/50  Physical Exam  Constitutional:       General: She is not in acute distress.     Appearance: She is normal weight. She is not ill-appearing, toxic-appearing or diaphoretic.      Comments: Slim female, somewhat older than his stated age looking   HENT:      Head: Normocephalic and atraumatic.      Right Ear: External ear normal.      Left Ear: External ear normal.      Nose: Nose normal.      Mouth/Throat:      Mouth: Mucous membranes are moist.      Pharynx: Oropharynx is clear.   Eyes:       Extraocular Movements: Extraocular movements intact.      Conjunctiva/sclera: Conjunctivae normal.      Pupils: Pupils are equal, round, and reactive to light.   Cardiovascular:      Rate and Rhythm: Normal rate and regular rhythm.      Heart sounds: No murmur heard.   No friction rub. No gallop.    Pulmonary:      Effort: No respiratory distress.      Breath sounds: No stridor. No wheezing or rales.   Chest:      Chest wall: No tenderness.   Abdominal:      General: Abdomen is flat. There is no distension.      Palpations: Abdomen is soft.      Tenderness: There is abdominal tenderness (left flank, suprapubic). There is no guarding or rebound.      Comments: Left flank mild tenderness, mild suprapubic tenderness   Musculoskeletal:         General: No swelling or tenderness.      Cervical back: No rigidity or tenderness.      Right lower leg: No edema.      Left lower leg: No edema.   Lymphadenopathy:      Cervical: No cervical adenopathy.   Skin:     General: Skin is warm and dry.      Coloration: Skin is not jaundiced.      Findings: No erythema.   Neurological:      Mental Status: She is alert and oriented to person, place, and time. Mental status is at baseline.      Sensory: No sensory deficit.      Motor: No weakness.      Coordination: Coordination normal.   Psychiatric:         Mood and Affect: Mood normal.         Behavior: Behavior normal.         Judgment: Judgment normal.           Results Reviewed:  I have personally reviewed current lab, radiology, and data and agree with results.  Lab Results (last 24 hours)     Procedure Component Value Units Date/Time    COVID-19 and FLU A/B PCR - Swab, Nasopharynx [[id number]] Collected: 10/01/21 2228    Specimen: Swab from Nasopharynx Updated: 10/01/21 2248    Gardnerella vaginalis, Trichomonas vaginalis, Candida albicans, DNA - Swab, Vagina [[id number]] Collected: 10/01/21 2218    Specimen: Swab from Vagina Updated: 10/01/21 2221    Chlamydia trachomatis, Neisseria  gonorrhoeae, Trichomonas vaginalis, PCR - Urine, Urine, Clean Catch [[id number]] Collected: 10/01/21 2145    Specimen: Urine, Clean Catch Updated: 10/01/21 2145    Lactic Acid, Plasma [[id number]]  (Normal) Collected: 10/01/21 2103    Specimen: Blood Updated: 10/01/21 2131     Lactate 1.3 mmol/L     Urinalysis, Microscopic Only - Urine, Catheter [[id number]]  (Abnormal) Collected: 10/01/21 2102    Specimen: Urine, Catheter Updated: 10/01/21 2122     RBC, UA 3-5 /HPF      WBC, UA 21-30 /HPF      Bacteria, UA None Seen /HPF      Squamous Epithelial Cells, UA 3-5 /HPF      Hyaline Casts, UA 0-2 /LPF      Methodology Automated Microscopy    Urinalysis With Microscopic If Indicated (No Culture) - Urine, Catheter [[id number]]  (Abnormal) Collected: 10/01/21 2102    Specimen: Urine, Catheter Updated: 10/01/21 2122     Color, UA Yellow     Appearance, UA Cloudy     pH, UA 8.0     Specific Gravity, UA 1.021     Glucose, UA Negative     Ketones, UA Negative     Bilirubin, UA Negative     Blood, UA Negative     Protein, UA Trace     Leuk Esterase, UA Small (1+)     Nitrite, UA Negative     Urobilinogen, UA 1.0 E.U./dL    Blood Culture - Blood, Arm, Left [[id number]] Collected: 10/01/21 2102    Specimen: Blood from Arm, Left Updated: 10/01/21 2115    Blood Culture - Blood, Hand, Right [[id number]] Collected: 10/01/21 2109    Specimen: Blood from Hand, Right Updated: 10/01/21 2115    Comprehensive Metabolic Panel [[id number]]  (Abnormal) Collected: 10/01/21 2030    Specimen: Blood Updated: 10/01/21 2055     Glucose 111 mg/dL      BUN 12 mg/dL      Creatinine 0.76 mg/dL      Sodium 136 mmol/L      Potassium 3.9 mmol/L      Chloride 99 mmol/L      CO2 28.0 mmol/L      Calcium 9.3 mg/dL      Total Protein 7.5 g/dL      Albumin 4.70 g/dL      ALT (SGPT) 37 U/L      AST (SGOT) 19 U/L      Alkaline Phosphatase 85 U/L      Total Bilirubin 0.2 mg/dL      eGFR Non African Amer 92 mL/min/1.73      Globulin 2.8 gm/dL      A/G Ratio 1.7 g/dL       BUN/Creatinine Ratio 15.8     Anion Gap 9.0 mmol/L     Narrative:      GFR Normal >60  Chronic Kidney Disease <60  Kidney Failure <15      Lipase [[id number]]  (Normal) Collected: 10/01/21 2030    Specimen: Blood Updated: 10/01/21 2055     Lipase 23 U/L     hCG, Serum, Qualitative [[id number]]  (Normal) Collected: 10/01/21 2030    Specimen: Blood Updated: 10/01/21 2052     HCG Qualitative Negative    CBC & Differential [[id number]]  (Abnormal) Collected: 10/01/21 2030    Specimen: Blood Updated: 10/01/21 2038    Narrative:      The following orders were created for panel order CBC & Differential.  Procedure                               Abnormality         Status                     ---------                               -----------         ------                     CBC Auto Differential[[id number]]        Abnormal            Final result                 Please view results for these tests on the individual orders.    CBC Auto Differential [[id number]]  (Abnormal) Collected: 10/01/21 2030    Specimen: Blood Updated: 10/01/21 2038     WBC 17.77 10*3/mm3      RBC 4.16 10*6/mm3      Hemoglobin 12.6 g/dL      Hematocrit 38.4 %      MCV 92.3 fL      MCH 30.3 pg      MCHC 32.8 g/dL      RDW 13.6 %      RDW-SD 45.6 fl      MPV 8.7 fL      Platelets 266 10*3/mm3      Neutrophil % 77.4 %      Lymphocyte % 13.8 %      Monocyte % 7.3 %      Eosinophil % 0.7 %      Basophil % 0.2 %      Immature Grans % 0.6 %      Neutrophils, Absolute 13.76 10*3/mm3      Lymphocytes, Absolute 2.45 10*3/mm3      Monocytes, Absolute 1.29 10*3/mm3      Eosinophils, Absolute 0.13 10*3/mm3      Basophils, Absolute 0.04 10*3/mm3      Immature Grans, Absolute 0.10 10*3/mm3      nRBC 0.0 /100 WBC         Imaging Results (Last 24 Hours)     Procedure Component Value Units Date/Time    XR Abdomen 2+ VW with Chest 1 VW [[id number]] Collected: 10/01/21 2138     Updated: 10/01/21 2200    Narrative:      XR ABDOMEN SUPINE AND ERECT WITH CHEST (ABD ACUTE  SERIES)    INDICATION: 26 years Female; abd pain    TECHNIQUE: 2 views of the abdomen and single view of the chest  were performed.     Comparison: 7/24/2021.    FINDINGS:  Lungs/Pleura: No acute airspace opacities, pleural effusion, or  pneumothorax.   Heart/Mediastinum: Unremarkable.   Bowel: Moderate stool throughout the colon. No bowel dilatation  or free air.  Calcifications: None.  Lines/Tubes: None.  Bones: Unremarkable.  Soft tissues: Unremarkable.  Incidental findings: Surgical clips are noted in the right upper  abdomen from previous cholecystectomy.      Impression:      1.  No acute cardiopulmonary abnormality.  2.  Moderate stool throughout the colon. No bowel dilatation or  free air.    Electronically signed by:  Linda Shelley  10/1/2021 9:59 PM CDT  Workstation: [id number]    CT Abdomen Pelvis Without Contrast [[id number]] Collected: 10/01/21 2037     Updated: 10/01/21 2127    Narrative:      EXAM DESCRIPTION:  CT ABDOMEN PELVIS WO CONTRAST     CLINICAL HISTORY:  26 years Female, flank pain    TECHNIQUE: Helical CT axial images are obtained from the lung  bases to the pubic symphysis without IV contrast. No oral  contrast was administered. Multiplanar reconstruction. This exam  was performed according to our departmental dose-optimization  program, which includes automated exposure control, adjustment of  the mA and/or kV according to patient size and/or use of  iterative reconstruction technique.    COMPARISON: 4/14/2015      FINDINGS:  LUNG BASES: No basilar consolidation or effusions.    LIVER:  Normal in size. Normal attenuation. No focal masses.    HEPATOBILIARY: Status post cholecystectomy.  No intra- or  extrahepatic ductal dilatation.    SPLEEN: Normal size.     PANCREAS: Normal size and contour. No focal mass.     ADRENAL GLANDS: Normal size. No adrenal masses.    KIDNEYS:  Bilateral kidneys are normal in size without  obstructing calculi or hydronephrosis. No nephrolithiasis. No  significant  cysts are present.    BOWEL AND MESENTERY: Diffuse colonic fecal retention without  bowel dilatation. No small or large bowel dilatation. No colonic  diverticulosis. Normal appendix.  No abnormal mesenteric  lymphadenopathy.  No free fluid or pneumoperitoneum.    RETROPERITONEUM:Normal caliber abdominal aorta without aneurysm.  No abnormal retroperitoneal lymphadenopathy.    PELVIS:  Urinary bladder is unremarkable.  Uterus and adnexal  structures are unremarkable.    ABDOMINAL WALL: The abdominal wall is intact.    BONES: No suspicious osseous lytic or blastic lesions seen.         Impression:      1.  No nephroureterolithiasis or obstructive uropathy.  2.   No acute intra-abdominal or pelvic disease. Normal appendix.  3.  Diffuse colonic fecal retention without bowel dilatation;  rule out constipation.  4.  Status post cholecystectomy.      Electronically signed by:  Luis Conte MD  10/1/2021 9:26  PM CDT Workstation: [id number]            Assessment:    Active Hospital Problems    Diagnosis    • Pyelonephritis      #Sepsis, sepsis present on admission in setting of urinary tract infection  # Possible urinary tract infection concerning left pyelonephritis  # Substance abuse methamphetamine, marijuana use  #Leukocytosis reactive  #History of tachycardia on metoprolol  #History of asthma, with no sign of exacerbation  #History of bipolar disorder, depression, anxiety, schizophrenia    #History of penicillin allergy in the form of rash  #Tobacco use  # Possible constipation with increased stool burden on CT    Plan:  Place on telemetry  Blood culture was obtained in ED   Her UA abnormal but no obvious bacterial was seen, as mentioned patient underwent pelvic examination in the ED and per report was not suggestive of pelvic inflammatory disease, infection   Add urine culture.  Continue aztreonam and add vancomycin considering her history of substance abuse and fever pending further evaluation and blood culture  results  Placed on IV fluid, supportive therapy  DuoNeb as needed, Xopenex not available at this facility  Laxative as needed  Reconcile home medication and continue with essential home medication  Please see orders for comprehensive plan  Substance abuse counseling provided.    I confirmed that the patient's Advance Care Plan is present, code status is documented, or surrogate decision maker is listed in the patient's medical record.   Full code    I have utilized all available immediate resources to obtain, update, or review the patient's current medications.     I discussed the patient's findings and my recommendations with: Patient and she agreed with above plan of care.      Saeid Behroozi, MD   10/01/21   23:06 CDT

## 2021-10-02 NOTE — ED NOTES
Pain started around last night. Took OTC, pain increased over the day. Pain started in left back/flank. Pain has since moved to LLQ. Nausea, patient took some zofran she had at home.      Carmen Boone RN  10/01/21 2018

## 2021-10-02 NOTE — PROGRESS NOTES
[facility] Medicine Services  INPATIENT PROGRESS NOTE    Length of Stay: 0  Date of Admission: 10/1/2021  Primary Care Physician: Tyron Chery APRN    Subjective   Chief Complaint: Left flank pain  HPI:  26 year old female with a history of substance abuse who presented with left flank pain and suprapubic pain.  UA was consistent with UTI.  CT of the abdomen noted increased fecal retention but was otherwise unremarkable.  She reports last bowel movement yesterday.     Review of Systems   Constitutional: Positive for chills and fever.   Respiratory: Negative for shortness of breath.    Cardiovascular: Negative for chest pain.   Gastrointestinal: Negative for abdominal pain, nausea and vomiting.   Genitourinary: Positive for flank pain.   All other systems reviewed and are negative.       All pertinent negatives and positives are as above. All other systems have been reviewed and are negative unless otherwise stated.     Objective    Temp:  [98.6 °F (37 °C)-102.6 °F (39.2 °C)] 98.6 °F (37 °C)  Heart Rate:  [] 92  Resp:  [18] 18  BP: ()/(50-70) 89/54    Physical Exam  Vitals reviewed.   Constitutional:       Appearance: Normal appearance.   HENT:      Head: Normocephalic and atraumatic.   Cardiovascular:      Rate and Rhythm: Normal rate and regular rhythm.   Pulmonary:      Effort: Pulmonary effort is normal. No respiratory distress.      Breath sounds: Normal breath sounds.   Abdominal:      General: There is no distension.      Palpations: Abdomen is soft.      Tenderness: There is no abdominal tenderness.   Musculoskeletal:         General: Tenderness (flank) present. No swelling or deformity.   Skin:     General: Skin is warm and dry.   Neurological:      General: No focal deficit present.      Mental Status: She is alert and oriented to person, place, and time.             Results Review:  I have reviewed the labs, radiology results, and diagnostic  studies.    Laboratory Data:   Results from last 7 days   Lab Units 10/02/21  0524 10/01/21  2030   SODIUM mmol/L 135* 136   POTASSIUM mmol/L 3.9 3.9   CHLORIDE mmol/L 104 99   CO2 mmol/L 25.0 28.0   BUN mg/dL 10 12   CREATININE mg/dL 0.60 0.76   GLUCOSE mg/dL 93 111*   CALCIUM mg/dL 8.5* 9.3   BILIRUBIN mg/dL 0.3 0.2   ALK PHOS U/L 76 85   ALT (SGPT) U/L 31 37*   AST (SGOT) U/L 16 19   ANION GAP mmol/L 6.0 9.0     Estimated Creatinine Clearance: 124.7 mL/min (by C-G formula based on SCr of 0.6 mg/dL).  Results from last 7 days   Lab Units 10/02/21  0524   MAGNESIUM mg/dL 2.1         Results from last 7 days   Lab Units 10/02/21  0524 10/01/21  2030   WBC 10*3/mm3 14.99* 17.77*   HEMOGLOBIN g/dL 11.5* 12.6   HEMATOCRIT % 34.8 38.4   PLATELETS 10*3/mm3 198 266           Culture Data:   No results found for: BLOODCX  Urine Culture   Date Value Ref Range Status   10/01/2021 25,000 CFU/mL Gram Negative Bacilli (A)  Preliminary     No results found for: RESPCX  No results found for: WOUNDCX  No results found for: STOOLCX  No components found for: BODYFLD    Radiology Data:   Imaging Results (Last 24 Hours)     Procedure Component Value Units Date/Time    XR Abdomen 2+ VW with Chest 1 VW [[id number]] Collected: 10/01/21 2138     Updated: 10/01/21 2200    Narrative:      XR ABDOMEN SUPINE AND ERECT WITH CHEST (ABD ACUTE SERIES)    INDICATION: 26 years Female; abd pain    TECHNIQUE: 2 views of the abdomen and single view of the chest  were performed.     Comparison: 7/24/2021.    FINDINGS:  Lungs/Pleura: No acute airspace opacities, pleural effusion, or  pneumothorax.   Heart/Mediastinum: Unremarkable.   Bowel: Moderate stool throughout the colon. No bowel dilatation  or free air.  Calcifications: None.  Lines/Tubes: None.  Bones: Unremarkable.  Soft tissues: Unremarkable.  Incidental findings: Surgical clips are noted in the right upper  abdomen from previous cholecystectomy.      Impression:      1.  No acute  cardiopulmonary abnormality.  2.  Moderate stool throughout the colon. No bowel dilatation or  free air.    Electronically signed by:  Linda Shelley  10/1/2021 9:59 PM CDT  Workstation: [id number]    CT Abdomen Pelvis Without Contrast [[id number]] Collected: 10/01/21 2037     Updated: 10/01/21 2127    Narrative:      EXAM DESCRIPTION:  CT ABDOMEN PELVIS WO CONTRAST     CLINICAL HISTORY:  26 years Female, flank pain    TECHNIQUE: Helical CT axial images are obtained from the lung  bases to the pubic symphysis without IV contrast. No oral  contrast was administered. Multiplanar reconstruction. This exam  was performed according to our departmental dose-optimization  program, which includes automated exposure control, adjustment of  the mA and/or kV according to patient size and/or use of  iterative reconstruction technique.    COMPARISON: 4/14/2015      FINDINGS:  LUNG BASES: No basilar consolidation or effusions.    LIVER:  Normal in size. Normal attenuation. No focal masses.    HEPATOBILIARY: Status post cholecystectomy.  No intra- or  extrahepatic ductal dilatation.    SPLEEN: Normal size.     PANCREAS: Normal size and contour. No focal mass.     ADRENAL GLANDS: Normal size. No adrenal masses.    KIDNEYS:  Bilateral kidneys are normal in size without  obstructing calculi or hydronephrosis. No nephrolithiasis. No  significant cysts are present.    BOWEL AND MESENTERY: Diffuse colonic fecal retention without  bowel dilatation. No small or large bowel dilatation. No colonic  diverticulosis. Normal appendix.  No abnormal mesenteric  lymphadenopathy.  No free fluid or pneumoperitoneum.    RETROPERITONEUM:Normal caliber abdominal aorta without aneurysm.  No abnormal retroperitoneal lymphadenopathy.    PELVIS:  Urinary bladder is unremarkable.  Uterus and adnexal  structures are unremarkable.    ABDOMINAL WALL: The abdominal wall is intact.    BONES: No suspicious osseous lytic or blastic lesions seen.         Impression:       1.  No nephroureterolithiasis or obstructive uropathy.  2.   No acute intra-abdominal or pelvic disease. Normal appendix.  3.  Diffuse colonic fecal retention without bowel dilatation;  rule out constipation.  4.  Status post cholecystectomy.      Electronically signed by:  Luis Conte MD  10/1/2021 9:26  PM CDT Workstation: [id number]          I have reviewed the patient's current medications.     Assessment/Plan     Active Hospital Problems    Diagnosis    • Pyelonephritis    Sepsis due to UTI  Substance abuse with methamphetamine  Possible constipation    Plan:    Broad spectrum antibiotics due to history of drug use until bacteremia is ruled out  Follow cultures  IV Fluid:  ml/hr  Pain control  Antiemetics  Bowel Regimen  VTE PPx: lovenox    I confirmed that the patient's Advance Care Plan is present, code status is documented, or surrogate decision maker is listed in the patient's medical record.     The patient was evaluated during the global COVID-19 pandemic, and the diagnosis was suspected/considered upon their initial presentation.  Evaluation, treatment, and testing were consistent with current guidelines for patients who present with complaints or symptoms that may be related to COVID-19.          This document has been electronically signed by HAI Carlin on October 2, 2021 13:09 CDT

## 2021-10-02 NOTE — PROGRESS NOTES
"  Pharmacokinetics by Pharmacy - Vancomycin    Astrid Taveras is a 26 y.o. female   [Ht: 175.3 cm (69\"); Wt: 55.6 kg (122 lb 9.6 oz)] is being started on Vancomycin for sepsis. Day 1    Estimated Creatinine Clearance: 124.7 mL/min (by C-G formula based on SCr of 0.6 mg/dL).   Creatinine   Date Value Ref Range Status   10/02/2021 0.60 0.57 - 1.00 mg/dL Final   10/01/2021 0.76 0.57 - 1.00 mg/dL Final   09/12/2019 0.52 (L) 0.57 - 1.00 mg/dL Final      Lab Results   Component Value Date    WBC 14.99 (H) 10/02/2021    WBC 17.77 (H) 10/01/2021    WBC 12.40 (H) 09/12/2019      Temp Readings from Last 1 Encounters:   10/02/21 98.6 °F (37 °C) (Oral)      C-Reactive Protein   Date Value Ref Range Status   07/28/2020 1.5 (H) 0.0 - 0.9 mg/dL Final     Lactate   Date Value Ref Range Status   10/01/2021 1.3 0.5 - 2.0 mmol/L Final       Indication for use: sepsis     Baseline culture results:  Microbiology Results (last 10 days)       Procedure Component Value - Date/Time    COVID-19 and FLU A/B PCR - Swab, Nasopharynx [[id number]]  (Normal) Collected: 10/01/21 2228    Lab Status: Final result Specimen: Swab from Nasopharynx Updated: 10/01/21 7215     COVID19 Not Detected     Influenza A PCR Not Detected     Influenza B PCR Not Detected    Narrative:      Fact sheet for providers: https://www.fda.gov/media/128431/download    Fact sheet for patients: https://www.fda.gov/media/414434/download    Test performed by PCR.    Gardnerella vaginalis, Trichomonas vaginalis, Candida albicans, DNA - Swab, Vagina [[id number]]  (Normal) Collected: 10/01/21 2218    Lab Status: Final result Specimen: Swab from Vagina Updated: 10/02/21 0001     ALEKSANDR ALBICANS Negative     GARDNERELLA VAGINALIS Negative     TRICHOMONAS VAGINALIS Negative          No results found for: RESPCX    Pt is also on Aztreonam, history of PCN allergy--still can use Ceftriaxone for UTI. Pt has also taken Augmentin in the past.     Assessment/Plan  Pt is a 26 year ol female " admitted for possible UTI/sepsis. Pt does have a history of drug use. Vancomycin 1 gm was started 10/2 at 0024. Dosed Vancomycin 1000 mg IVPB Q12H to start at 1300. Ordered a peak level for tomorrow at 0300 and trough level 10/3 at 1230. This gives an AUC of 493.71, trough of 10.9 and peak of 34.23- all in goal range. Blood and urine cultures are pending.   Pharmacy will monitor renal function and adjust dose accordingly.    Marianne Weaver, PharmD  10/02/21 08:58 CDT

## 2021-10-02 NOTE — ED NOTES
Patient states her normal heart rate is normally 115-120. She does have a cardiac provider for the tachycardia      Carmen Boone RN  10/01/21 2023

## 2021-10-03 VITALS
OXYGEN SATURATION: 98 % | RESPIRATION RATE: 16 BRPM | BODY MASS INDEX: 18.25 KG/M2 | WEIGHT: 123.2 LBS | HEIGHT: 69 IN | TEMPERATURE: 97.1 F | HEART RATE: 85 BPM | SYSTOLIC BLOOD PRESSURE: 99 MMHG | DIASTOLIC BLOOD PRESSURE: 57 MMHG

## 2021-10-03 LAB
ALBUMIN SERPL-MCNC: 3.4 G/DL (ref 3.5–5.2)
ALBUMIN/GLOB SERPL: 1.1 G/DL
ALP SERPL-CCNC: 66 U/L (ref 39–117)
ALT SERPL W P-5'-P-CCNC: 25 U/L (ref 1–33)
ANION GAP SERPL CALCULATED.3IONS-SCNC: 8 MMOL/L (ref 5–15)
AST SERPL-CCNC: 13 U/L (ref 1–32)
BACTERIA SPEC AEROBE CULT: ABNORMAL
BASOPHILS # BLD AUTO: 0.04 10*3/MM3 (ref 0–0.2)
BASOPHILS NFR BLD AUTO: 0.4 % (ref 0–1.5)
BILIRUB SERPL-MCNC: 0.2 MG/DL (ref 0–1.2)
BUN SERPL-MCNC: 10 MG/DL (ref 6–20)
BUN/CREAT SERPL: 19.2 (ref 7–25)
C TRACH RRNA CVX QL NAA+PROBE: NEGATIVE
CALCIUM SPEC-SCNC: 8.8 MG/DL (ref 8.6–10.5)
CHLORIDE SERPL-SCNC: 103 MMOL/L (ref 98–107)
CO2 SERPL-SCNC: 24 MMOL/L (ref 22–29)
CREAT SERPL-MCNC: 0.52 MG/DL (ref 0.57–1)
DEPRECATED RDW RBC AUTO: 45.5 FL (ref 37–54)
EOSINOPHIL # BLD AUTO: 0.61 10*3/MM3 (ref 0–0.4)
EOSINOPHIL NFR BLD AUTO: 6 % (ref 0.3–6.2)
ERYTHROCYTE [DISTWIDTH] IN BLOOD BY AUTOMATED COUNT: 13.3 % (ref 12.3–15.4)
GFR SERPL CREATININE-BSD FRML MDRD: 143 ML/MIN/1.73
GLOBULIN UR ELPH-MCNC: 3 GM/DL
GLUCOSE SERPL-MCNC: 92 MG/DL (ref 65–99)
HCT VFR BLD AUTO: 35 % (ref 34–46.6)
HGB BLD-MCNC: 11.5 G/DL (ref 12–15.9)
IMM GRANULOCYTES # BLD AUTO: 0.04 10*3/MM3 (ref 0–0.05)
IMM GRANULOCYTES NFR BLD AUTO: 0.4 % (ref 0–0.5)
LYMPHOCYTES # BLD AUTO: 2.85 10*3/MM3 (ref 0.7–3.1)
LYMPHOCYTES NFR BLD AUTO: 28.1 % (ref 19.6–45.3)
MCH RBC QN AUTO: 30.5 PG (ref 26.6–33)
MCHC RBC AUTO-ENTMCNC: 32.9 G/DL (ref 31.5–35.7)
MCV RBC AUTO: 92.8 FL (ref 79–97)
MONOCYTES # BLD AUTO: 0.72 10*3/MM3 (ref 0.1–0.9)
MONOCYTES NFR BLD AUTO: 7.1 % (ref 5–12)
N GONORRHOEA RRNA SPEC QL NAA+PROBE: NEGATIVE
NEUTROPHILS NFR BLD AUTO: 5.88 10*3/MM3 (ref 1.7–7)
NEUTROPHILS NFR BLD AUTO: 58 % (ref 42.7–76)
NRBC BLD AUTO-RTO: 0 /100 WBC (ref 0–0.2)
PLATELET # BLD AUTO: 229 10*3/MM3 (ref 140–450)
PMV BLD AUTO: 9.3 FL (ref 6–12)
POTASSIUM SERPL-SCNC: 4.2 MMOL/L (ref 3.5–5.2)
PROT SERPL-MCNC: 6.4 G/DL (ref 6–8.5)
RBC # BLD AUTO: 3.77 10*6/MM3 (ref 3.77–5.28)
SODIUM SERPL-SCNC: 135 MMOL/L (ref 136–145)
TRICHOMONAS VAGINALIS PCR: NEGATIVE
VANCOMYCIN TROUGH SERPL-MCNC: 22.8 MCG/ML (ref 5–20)
WBC # BLD AUTO: 10.14 10*3/MM3 (ref 3.4–10.8)

## 2021-10-03 PROCEDURE — 96366 THER/PROPH/DIAG IV INF ADDON: CPT

## 2021-10-03 PROCEDURE — 25010000002 VANCOMYCIN 1 G RECONSTITUTED SOLUTION: Performed by: INTERNAL MEDICINE

## 2021-10-03 PROCEDURE — 85025 COMPLETE CBC W/AUTO DIFF WBC: CPT | Performed by: NURSE PRACTITIONER

## 2021-10-03 PROCEDURE — G0378 HOSPITAL OBSERVATION PER HR: HCPCS

## 2021-10-03 PROCEDURE — 96361 HYDRATE IV INFUSION ADD-ON: CPT

## 2021-10-03 PROCEDURE — 80053 COMPREHEN METABOLIC PANEL: CPT | Performed by: NURSE PRACTITIONER

## 2021-10-03 PROCEDURE — 96372 THER/PROPH/DIAG INJ SC/IM: CPT

## 2021-10-03 PROCEDURE — 36415 COLL VENOUS BLD VENIPUNCTURE: CPT | Performed by: NURSE PRACTITIONER

## 2021-10-03 PROCEDURE — 25010000002 ENOXAPARIN PER 10 MG: Performed by: NURSE PRACTITIONER

## 2021-10-03 RX ORDER — LEVOFLOXACIN 500 MG/1
500 TABLET, FILM COATED ORAL DAILY
Qty: 7 TABLET | Refills: 0 | Status: SHIPPED | OUTPATIENT
Start: 2021-10-03 | End: 2021-10-10

## 2021-10-03 RX ADMIN — Medication 12.5 MG: at 08:19

## 2021-10-03 RX ADMIN — SODIUM CHLORIDE, POTASSIUM CHLORIDE, SODIUM LACTATE AND CALCIUM CHLORIDE 125 ML/HR: 600; 310; 30; 20 INJECTION, SOLUTION INTRAVENOUS at 06:05

## 2021-10-03 RX ADMIN — AZTREONAM 2 G: 2 INJECTION, POWDER, LYOPHILIZED, FOR SOLUTION INTRAMUSCULAR; INTRAVENOUS at 06:51

## 2021-10-03 RX ADMIN — ACETAMINOPHEN 650 MG: 325 TABLET, FILM COATED ORAL at 05:07

## 2021-10-03 RX ADMIN — VANCOMYCIN HYDROCHLORIDE 1000 MG: 1 INJECTION, POWDER, LYOPHILIZED, FOR SOLUTION INTRAVENOUS at 03:54

## 2021-10-03 RX ADMIN — ENOXAPARIN SODIUM 40 MG: 40 INJECTION SUBCUTANEOUS at 08:19

## 2021-10-03 RX ADMIN — AZTREONAM 2 G: 2 INJECTION, POWDER, LYOPHILIZED, FOR SOLUTION INTRAMUSCULAR; INTRAVENOUS at 00:27

## 2021-10-03 RX ADMIN — DOCUSATE SODIUM 50 MG AND SENNOSIDES 8.6 MG 2 TABLET: 8.6; 5 TABLET, FILM COATED ORAL at 08:19

## 2021-10-03 NOTE — PLAN OF CARE
Goal Outcome Evaluation:  Plan of Care Reviewed With: patient        Progress: improving  Outcome Summary: patient has rested well through night; no s/s of distress; will continue to monitor.

## 2021-10-06 LAB
BACTERIA SPEC AEROBE CULT: NORMAL
BACTERIA SPEC AEROBE CULT: NORMAL

## 2022-01-17 ENCOUNTER — APPOINTMENT (OUTPATIENT)
Dept: CT IMAGING | Facility: HOSPITAL | Age: 27
End: 2022-01-17

## 2022-01-17 ENCOUNTER — HOSPITAL ENCOUNTER (EMERGENCY)
Facility: HOSPITAL | Age: 27
Discharge: LEFT BEFORE DISCHARGE | End: 2022-01-17
Attending: EMERGENCY MEDICINE | Admitting: EMERGENCY MEDICINE

## 2022-01-17 VITALS
SYSTOLIC BLOOD PRESSURE: 115 MMHG | WEIGHT: 130 LBS | HEART RATE: 110 BPM | HEIGHT: 69 IN | RESPIRATION RATE: 18 BRPM | OXYGEN SATURATION: 100 % | TEMPERATURE: 98 F | BODY MASS INDEX: 19.26 KG/M2 | DIASTOLIC BLOOD PRESSURE: 66 MMHG

## 2022-01-17 DIAGNOSIS — R10.9 RIGHT SIDED ABDOMINAL PAIN: Primary | ICD-10-CM

## 2022-01-17 LAB
ALBUMIN SERPL-MCNC: 4.7 G/DL (ref 3.5–5.2)
ALBUMIN/GLOB SERPL: 1.7 G/DL
ALP SERPL-CCNC: 82 U/L (ref 39–117)
ALT SERPL W P-5'-P-CCNC: 20 U/L (ref 1–33)
ANION GAP SERPL CALCULATED.3IONS-SCNC: 10 MMOL/L (ref 5–15)
AST SERPL-CCNC: 16 U/L (ref 1–32)
B-HCG UR QL: NEGATIVE
BACTERIA UR QL AUTO: ABNORMAL /HPF
BASOPHILS # BLD AUTO: 0.12 10*3/MM3 (ref 0–0.2)
BASOPHILS NFR BLD AUTO: 0.7 % (ref 0–1.5)
BILIRUB SERPL-MCNC: <0.2 MG/DL (ref 0–1.2)
BILIRUB UR QL STRIP: NEGATIVE
BUN SERPL-MCNC: 23 MG/DL (ref 6–20)
BUN/CREAT SERPL: 32.9 (ref 7–25)
CALCIUM SPEC-SCNC: 9.3 MG/DL (ref 8.6–10.5)
CHLORIDE SERPL-SCNC: 104 MMOL/L (ref 98–107)
CLARITY UR: CLEAR
CO2 SERPL-SCNC: 25 MMOL/L (ref 22–29)
COLOR UR: YELLOW
CREAT SERPL-MCNC: 0.7 MG/DL (ref 0.57–1)
DEPRECATED RDW RBC AUTO: 41.6 FL (ref 37–54)
EOSINOPHIL # BLD AUTO: 1.33 10*3/MM3 (ref 0–0.4)
EOSINOPHIL NFR BLD AUTO: 8 % (ref 0.3–6.2)
ERYTHROCYTE [DISTWIDTH] IN BLOOD BY AUTOMATED COUNT: 12.5 % (ref 12.3–15.4)
FLUAV SUBTYP SPEC NAA+PROBE: NOT DETECTED
FLUBV RNA ISLT QL NAA+PROBE: NOT DETECTED
GFR SERPL CREATININE-BSD FRML MDRD: 101 ML/MIN/1.73
GLOBULIN UR ELPH-MCNC: 2.8 GM/DL
GLUCOSE SERPL-MCNC: 71 MG/DL (ref 65–99)
GLUCOSE UR STRIP-MCNC: NEGATIVE MG/DL
HCT VFR BLD AUTO: 45.4 % (ref 34–46.6)
HGB BLD-MCNC: 15 G/DL (ref 12–15.9)
HGB UR QL STRIP.AUTO: NEGATIVE
HYALINE CASTS UR QL AUTO: ABNORMAL /LPF
IMM GRANULOCYTES # BLD AUTO: 0.14 10*3/MM3 (ref 0–0.05)
IMM GRANULOCYTES NFR BLD AUTO: 0.8 % (ref 0–0.5)
KETONES UR QL STRIP: NEGATIVE
LEUKOCYTE ESTERASE UR QL STRIP.AUTO: ABNORMAL
LIPASE SERPL-CCNC: 36 U/L (ref 13–60)
LYMPHOCYTES # BLD AUTO: 4.81 10*3/MM3 (ref 0.7–3.1)
LYMPHOCYTES NFR BLD AUTO: 29.1 % (ref 19.6–45.3)
MCH RBC QN AUTO: 29.9 PG (ref 26.6–33)
MCHC RBC AUTO-ENTMCNC: 33 G/DL (ref 31.5–35.7)
MCV RBC AUTO: 90.6 FL (ref 79–97)
MONOCYTES # BLD AUTO: 0.66 10*3/MM3 (ref 0.1–0.9)
MONOCYTES NFR BLD AUTO: 4 % (ref 5–12)
NEUTROPHILS NFR BLD AUTO: 57.4 % (ref 42.7–76)
NEUTROPHILS NFR BLD AUTO: 9.49 10*3/MM3 (ref 1.7–7)
NITRITE UR QL STRIP: NEGATIVE
NRBC BLD AUTO-RTO: 0 /100 WBC (ref 0–0.2)
PH UR STRIP.AUTO: <=5 [PH] (ref 5–9)
PLATELET # BLD AUTO: 280 10*3/MM3 (ref 140–450)
PMV BLD AUTO: 9 FL (ref 6–12)
POTASSIUM SERPL-SCNC: 3.8 MMOL/L (ref 3.5–5.2)
PROT SERPL-MCNC: 7.5 G/DL (ref 6–8.5)
PROT UR QL STRIP: NEGATIVE
RBC # BLD AUTO: 5.01 10*6/MM3 (ref 3.77–5.28)
RBC # UR STRIP: ABNORMAL /HPF
REF LAB TEST METHOD: ABNORMAL
SARS-COV-2 RNA PNL SPEC NAA+PROBE: NOT DETECTED
SODIUM SERPL-SCNC: 139 MMOL/L (ref 136–145)
SP GR UR STRIP: 1.02 (ref 1–1.03)
SQUAMOUS #/AREA URNS HPF: ABNORMAL /HPF
UROBILINOGEN UR QL STRIP: ABNORMAL
WBC # UR STRIP: ABNORMAL /HPF
WBC NRBC COR # BLD: 16.55 10*3/MM3 (ref 3.4–10.8)

## 2022-01-17 PROCEDURE — 83690 ASSAY OF LIPASE: CPT | Performed by: EMERGENCY MEDICINE

## 2022-01-17 PROCEDURE — 87086 URINE CULTURE/COLONY COUNT: CPT | Performed by: EMERGENCY MEDICINE

## 2022-01-17 PROCEDURE — 87636 SARSCOV2 & INF A&B AMP PRB: CPT | Performed by: EMERGENCY MEDICINE

## 2022-01-17 PROCEDURE — 81025 URINE PREGNANCY TEST: CPT | Performed by: EMERGENCY MEDICINE

## 2022-01-17 PROCEDURE — 81001 URINALYSIS AUTO W/SCOPE: CPT | Performed by: EMERGENCY MEDICINE

## 2022-01-17 PROCEDURE — 85025 COMPLETE CBC W/AUTO DIFF WBC: CPT | Performed by: EMERGENCY MEDICINE

## 2022-01-17 PROCEDURE — 99283 EMERGENCY DEPT VISIT LOW MDM: CPT

## 2022-01-17 PROCEDURE — 80053 COMPREHEN METABOLIC PANEL: CPT | Performed by: EMERGENCY MEDICINE

## 2022-01-17 PROCEDURE — C9803 HOPD COVID-19 SPEC COLLECT: HCPCS

## 2022-01-17 RX ADMIN — SODIUM CHLORIDE 500 ML: 9 INJECTION, SOLUTION INTRAVENOUS at 22:17

## 2022-01-18 NOTE — ED PROVIDER NOTES
Subjective   26 years old female with history of anxiety depression/schizophrenia presented to the ER with 1 week history of right flank/right lower quadrant pain moderate intensity without any significant aggravating or relieving factors, associated with nausea but no vomiting.  Patient has been seen outpatient, was diagnosed with UTI and currently on Cipro with no significant relief.  Denies fever or chills.  Also has positive COVID exposure.      History provided by:  Patient      Review of Systems   Constitutional: Negative for chills and fever.   HENT: Negative for congestion, postnasal drip, rhinorrhea, sinus pressure and sore throat.    Respiratory: Negative for chest tightness and shortness of breath.    Cardiovascular: Negative for chest pain and palpitations.   Gastrointestinal: Positive for abdominal pain and nausea.   Genitourinary: Positive for flank pain.   Musculoskeletal: Negative for joint swelling.   Skin: Negative for color change.   Neurological: Negative for syncope and headaches.   Psychiatric/Behavioral: Negative for agitation.       Past Medical History:   Diagnosis Date   • Abnormal Pap smear of cervix    • Anxiety    • Asthma    • Bipolar disorder (HCC)    • Depression    • Endometriosis    • History of chicken pox    • Ovarian cyst    • Schizophrenia (HCC)        Allergies   Allergen Reactions   • Toradol [Ketorolac Tromethamine] Rash   • Penicillins Rash, Other (See Comments) and Itching     fever  Pt states she gets a fever       Past Surgical History:   Procedure Laterality Date   • CHOLECYSTECTOMY     • LAPAROSCOPIC CHOLECYSTECTOMY     • WISDOM TOOTH EXTRACTION         Family History   Problem Relation Age of Onset   • Nephritis Father    • Hypertension Mother    • Mental retardation Brother    • No Known Problems Sister    • Lupus Paternal Grandfather    • Breast cancer Paternal Grandmother    • Dementia Maternal Grandmother    • No Known Problems Maternal Grandfather    • No Known  Problems Brother    • Breast cancer Paternal Aunt        Social History     Socioeconomic History   • Marital status: Single   Tobacco Use   • Smoking status: Current Every Day Smoker     Packs/day: 0.50     Types: Cigarettes   • Smokeless tobacco: Never Used   • Tobacco comment: 2-3 cigarettes per day   Substance and Sexual Activity   • Alcohol use: No   • Drug use: Yes     Types: Marijuana, Amphetamines, Methamphetamines   • Sexual activity: Yes     Partners: Male     Comment: last pap smear 2018- negative at dr. lane's office            Objective   Physical Exam  Vitals and nursing note reviewed.   Constitutional:       Appearance: She is well-developed.   HENT:      Head: Normocephalic.      Mouth/Throat:      Mouth: Mucous membranes are moist.   Eyes:      Extraocular Movements: Extraocular movements intact.   Cardiovascular:      Rate and Rhythm: Normal rate and regular rhythm.   Pulmonary:      Effort: Pulmonary effort is normal.      Breath sounds: Normal breath sounds.   Abdominal:      General: Abdomen is flat. Bowel sounds are normal.      Palpations: Abdomen is soft.      Tenderness: There is abdominal tenderness in the right upper quadrant, right lower quadrant and periumbilical area.   Skin:     General: Skin is warm.      Capillary Refill: Capillary refill takes less than 2 seconds.   Neurological:      General: No focal deficit present.      Mental Status: She is alert.   Psychiatric:         Mood and Affect: Mood normal.         Procedures           ED Course                                                 MDM  Number of Diagnoses or Management Options  Right sided abdominal pain  Diagnosis management comments: She is given Toradol for symptomatic relief, on reevaluation feeling better.  Baseline work-up showed white count of 16, still having UTI.  Patient has a CT abdomen pelvis which is pending, patient did not want to wait for CT and decided to leave.  She does not want antibiotics changed.   Discussed with patient about delaying the diagnosis and worsening of condition but she does not want to stay at all.  Patient is not confused or altered, understands the consequences of leaving without full work-up and could be acute appendicitis, perforation, obstruction etc. and wants to leave and will follow-up with her PCP in the morning.       Amount and/or Complexity of Data Reviewed  Clinical lab tests: ordered and reviewed      Labs Reviewed   COMPREHENSIVE METABOLIC PANEL - Abnormal; Notable for the following components:       Result Value    BUN 23 (*)     BUN/Creatinine Ratio 32.9 (*)     All other components within normal limits    Narrative:     GFR Normal >60  Chronic Kidney Disease <60  Kidney Failure <15     URINALYSIS W/ CULTURE IF INDICATED - Abnormal; Notable for the following components:    Leuk Esterase, UA Moderate (2+) (*)     All other components within normal limits   CBC WITH AUTO DIFFERENTIAL - Abnormal; Notable for the following components:    WBC 16.55 (*)     Monocyte % 4.0 (*)     Eosinophil % 8.0 (*)     Immature Grans % 0.8 (*)     Neutrophils, Absolute 9.49 (*)     Lymphocytes, Absolute 4.81 (*)     Eosinophils, Absolute 1.33 (*)     Immature Grans, Absolute 0.14 (*)     All other components within normal limits   URINALYSIS, MICROSCOPIC ONLY - Abnormal; Notable for the following components:    RBC, UA 0-2 (*)     WBC, UA 6-12 (*)     Bacteria, UA Trace (*)     Squamous Epithelial Cells, UA 3-5 (*)     All other components within normal limits   COVID-19 AND FLU A/B, NP SWAB IN TRANSPORT MEDIA 8-12 HR TAT - Normal    Narrative:     Fact sheet for providers: https://www.fda.gov/media/626578/download    Fact sheet for patients: https://www.fda.gov/media/272888/download    Test performed by PCR.   LIPASE - Normal   PREGNANCY, URINE - Normal   URINE CULTURE   CBC AND DIFFERENTIAL    Narrative:     The following orders were created for panel order CBC & Differential.  Procedure                                Abnormality         Status                     ---------                               -----------         ------                     CBC Auto Differential[[id number]]        Abnormal            Final result                 Please view results for these tests on the individual orders.       No results found.        Final diagnoses:   Right sided abdominal pain       ED Disposition  ED Disposition     ED Disposition Condition Comment    AMA            No follow-up provider specified.       Medication List      No changes were made to your prescriptions during this visit.          Archie Juares MD  01/17/22 [id number]

## 2022-01-18 NOTE — ED TRIAGE NOTES
Pt presents to ED with complaints of RUQ pain that has been going for a months. Pt states that pain is worse after eating, pt states she has had her gallbladder removed.  Pt states that she has also been  Exposed to COVID but has not had any symptoms. VSS. NAD noted. A&OX4

## 2022-01-19 LAB — BACTERIA SPEC AEROBE CULT: NO GROWTH

## 2022-02-08 NOTE — DISCHARGE SUMMARY
[facility] Medicine Services  DISCHARGE SUMMARY       Date of Admission: 10/1/2021  Date of Discharge:  10/3/2021  Primary Care Physician: Tyron Chery APRN    Presenting Problem/History of Present Illness:  Pyelonephritis [N12]  Left lower quadrant abdominal pain [R10.32]  Sepsis without acute organ dysfunction, due to unspecified organism (HCC) [A41.9]     Final Discharge Diagnoses:    Pyelonephritis    Sepsis due to UTI    Substance abuse with methamphetamine    Consults:   Consults     No orders found from 9/2/2021 to 10/2/2021.          Pertinent Test Results:   CT Abdomen Pelvis Without Contrast    Result Date: 10/1/2021  EXAM DESCRIPTION: CT ABDOMEN PELVIS WO CONTRAST CLINICAL HISTORY: 26 years Female, flank pain TECHNIQUE: Helical CT axial images are obtained from the lung bases to the pubic symphysis without IV contrast. No oral contrast was administered. Multiplanar reconstruction. This exam was performed according to our departmental dose-optimization program, which includes automated exposure control, adjustment of the mA and/or kV according to patient size and/or use of iterative reconstruction technique. COMPARISON: 4/14/2015 FINDINGS: LUNG BASES: No basilar consolidation or effusions. LIVER:  Normal in size. Normal attenuation. No focal masses. HEPATOBILIARY: Status post cholecystectomy.  No intra- or extrahepatic ductal dilatation. SPLEEN: Normal size. PANCREAS: Normal size and contour. No focal mass. ADRENAL GLANDS: Normal size. No adrenal masses. KIDNEYS:  Bilateral kidneys are normal in size without obstructing calculi or hydronephrosis. No nephrolithiasis. No significant cysts are present. BOWEL AND MESENTERY: Diffuse colonic fecal retention without bowel dilatation. No small or large bowel dilatation. No colonic diverticulosis. Normal appendix.  No abnormal mesenteric lymphadenopathy.  No free fluid or pneumoperitoneum. RETROPERITONEUM:Normal caliber  abdominal aorta without aneurysm. No abnormal retroperitoneal lymphadenopathy. PELVIS:  Urinary bladder is unremarkable.  Uterus and adnexal structures are unremarkable. ABDOMINAL WALL: The abdominal wall is intact. BONES: No suspicious osseous lytic or blastic lesions seen.     1.  No nephroureterolithiasis or obstructive uropathy. 2.   No acute intra-abdominal or pelvic disease. Normal appendix. 3.  Diffuse colonic fecal retention without bowel dilatation; rule out constipation. 4.  Status post cholecystectomy. Electronically signed by:  Luis Conte MD  10/1/2021 9:26 PM CDT Workstation: [id number]    XR Abdomen 2+ VW with Chest 1 VW    Result Date: 10/1/2021  XR ABDOMEN SUPINE AND ERECT WITH CHEST (ABD ACUTE SERIES) INDICATION: 26 years Female; abd pain TECHNIQUE: 2 views of the abdomen and single view of the chest were performed. Comparison: 7/24/2021. FINDINGS: Lungs/Pleura: No acute airspace opacities, pleural effusion, or pneumothorax. Heart/Mediastinum: Unremarkable. Bowel: Moderate stool throughout the colon. No bowel dilatation or free air. Calcifications: None. Lines/Tubes: None. Bones: Unremarkable. Soft tissues: Unremarkable. Incidental findings: Surgical clips are noted in the right upper abdomen from previous cholecystectomy.     1.  No acute cardiopulmonary abnormality. 2.  Moderate stool throughout the colon. No bowel dilatation or free air. Electronically signed by:  Linda Shelley  10/1/2021 9:59 PM CDT Workstation: [id number]      HPI/Hospital Course:  The patient is a 26 y.o. female with a history of substance abuse who presented to [facility] with left flank pain and suprapubic pain.  UA was consistent with UTI.  CT of the abdomen noted increased fecal retention but was otherwise unremarkable.  Blood cultures were negative.  Urine culture grew E. Coli.  She is afebrile and tolerating diet.  She is discharged to home in stable condition on antibiotics.     Condition on  "Discharge:  Stable    Physical Exam on Discharge:  BP 99/57 (BP Location: Left arm, Patient Position: Lying)   Pulse 85   Temp 97.1 °F (36.2 °C) (Oral)   Resp 16   Ht 175.3 cm (69.02\")   Wt 55.9 kg (123 lb 3.2 oz)   LMP 08/01/2021   SpO2 98%   BMI 18.19 kg/m²   Physical Exam  Vitals reviewed.   Constitutional:       Appearance: Normal appearance.   HENT:      Head: Normocephalic and atraumatic.   Cardiovascular:      Rate and Rhythm: Normal rate and regular rhythm.   Pulmonary:      Effort: Pulmonary effort is normal. No respiratory distress.      Breath sounds: Normal breath sounds.   Abdominal:      General: There is no distension.      Palpations: Abdomen is soft.      Tenderness: There is no abdominal tenderness.   Musculoskeletal:         General: No swelling or deformity.   Skin:     General: Skin is warm and dry.   Neurological:      General: No focal deficit present.      Mental Status: She is alert and oriented to person, place, and time.           Discharge Disposition:  Home or Self Care    Discharge Medications:     Discharge Medications      New Medications      Instructions Start Date   levoFLOXacin 500 MG tablet  Commonly known as: Levaquin   500 mg, Oral, Daily         Continue These Medications      Instructions Start Date   albuterol sulfate  (90 Base) MCG/ACT inhaler  Commonly known as: PROVENTIL HFA;VENTOLIN HFA;PROAIR HFA   2 puffs, Inhalation, Once As Needed      benzonatate 200 MG capsule  Commonly known as: TESSALON   200 mg, Oral, 3 Times Daily PRN      levalbuterol 45 MCG/ACT inhaler  Commonly known as: XOPENEX HFA   1-2 puffs, Inhalation      metoprolol succinate XL 25 MG 24 hr tablet  Commonly known as: TOPROL-XL   Take 1/2 tablet by mouth twice      Mucinex 600 MG 12 hr tablet  Generic drug: guaiFENesin   1,200 mg, Oral, 2 Times Daily         Stop These Medications    methylPREDNISolone 4 MG dose pack  Commonly known as: MEDROL          Discharge Diet:   Diet Instructions  "    Diet: Regular; Thin      Discharge Diet: Regular    Fluid Consistency: Thin       Diet as tolerated            Activity at Discharge:   Activity Instructions     Activity as Tolerated     Additional Activity Instructions:     Patient us as tolerated with no restrictions            Follow-up Appointments:   1. PCP 1 week    Test Results Pending at Discharge:   Pending Labs     Order Current Status    Blood Culture - Blood, Arm, Left Preliminary result    Blood Culture - Blood, Hand, Right Preliminary result          HAI Carlin  10/03/21  12:22 CDT                   flank